# Patient Record
Sex: MALE | Race: WHITE | Employment: OTHER | URBAN - METROPOLITAN AREA
[De-identification: names, ages, dates, MRNs, and addresses within clinical notes are randomized per-mention and may not be internally consistent; named-entity substitution may affect disease eponyms.]

---

## 2017-01-10 DIAGNOSIS — M51.16 LUMBAR DISC DISEASE WITH RADICULOPATHY: Primary | ICD-10-CM

## 2017-01-10 RX ORDER — CYCLOBENZAPRINE HCL 10 MG
TABLET ORAL
Refills: 1 | COMMUNITY
Start: 2017-01-04 | End: 2017-04-20

## 2017-01-10 RX ORDER — LIDOCAINE 50 MG/G
OINTMENT TOPICAL
Qty: 50 G | Refills: 3 | Status: SHIPPED | OUTPATIENT
Start: 2017-01-10 | End: 2017-09-22

## 2017-01-10 NOTE — TELEPHONE ENCOUNTER
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  Patient is working 11 hour days and not getting proper rest. His throat sometimes feels like i

## 2017-01-23 ENCOUNTER — TELEPHONE (OUTPATIENT)
Dept: INTERNAL MEDICINE CLINIC | Facility: CLINIC | Age: 57
End: 2017-01-23

## 2017-01-26 NOTE — TELEPHONE ENCOUNTER
Patient had exposure to cleaning fumes and caused a breathing episode. Advised patient to get checked at the emergency department or urgent for the fumes exposure.    Advised patient not to stop Lisinopril, patient stated he gets dizzy spells from the lisin

## 2017-01-30 ENCOUNTER — TELEPHONE (OUTPATIENT)
Dept: INTERNAL MEDICINE CLINIC | Facility: CLINIC | Age: 57
End: 2017-01-30

## 2017-02-01 ENCOUNTER — TELEPHONE (OUTPATIENT)
Dept: INTERNAL MEDICINE CLINIC | Facility: CLINIC | Age: 57
End: 2017-02-01

## 2017-02-01 NOTE — TELEPHONE ENCOUNTER
Spoke with patient he has a court date pending for disability, made appointment to see Dr Dixie Raman

## 2017-02-01 NOTE — TELEPHONE ENCOUNTER
Right side of right foot about 2 inches from ankle on the bone is very sore he is icing patient is on his feet a lot, Scheduled appointment with Dr Claudette Prado for Sat 2/18

## 2017-02-16 RX ORDER — FLUTICASONE PROPIONATE AND SALMETEROL 500; 50 UG/1; UG/1
1 POWDER RESPIRATORY (INHALATION) 2 TIMES DAILY
Qty: 60 EACH | Refills: 2 | Status: SHIPPED | OUTPATIENT
Start: 2017-02-16 | End: 2017-02-18

## 2017-02-18 ENCOUNTER — OFFICE VISIT (OUTPATIENT)
Dept: INTERNAL MEDICINE CLINIC | Facility: CLINIC | Age: 57
End: 2017-02-18

## 2017-02-18 VITALS
DIASTOLIC BLOOD PRESSURE: 80 MMHG | BODY MASS INDEX: 32.14 KG/M2 | WEIGHT: 217 LBS | HEART RATE: 88 BPM | RESPIRATION RATE: 14 BRPM | TEMPERATURE: 98 F | HEIGHT: 69 IN | SYSTOLIC BLOOD PRESSURE: 132 MMHG | OXYGEN SATURATION: 98 %

## 2017-02-18 DIAGNOSIS — M47.812 OSTEOARTHRITIS OF CERVICAL SPINE, UNSPECIFIED SPINAL OSTEOARTHRITIS COMPLICATION STATUS: ICD-10-CM

## 2017-02-18 DIAGNOSIS — J38.5 LARYNGEAL SPASM: ICD-10-CM

## 2017-02-18 DIAGNOSIS — M21.961 METATARSAL DEFORMITY, RIGHT: ICD-10-CM

## 2017-02-18 DIAGNOSIS — M51.16 LUMBAR DISC DISEASE WITH RADICULOPATHY: ICD-10-CM

## 2017-02-18 DIAGNOSIS — I10 HTN (HYPERTENSION), BENIGN: ICD-10-CM

## 2017-02-18 DIAGNOSIS — M79.671 FOOT PAIN, RIGHT: Primary | ICD-10-CM

## 2017-02-18 DIAGNOSIS — J45.41 MODERATE PERSISTENT ASTHMA WITH ACUTE EXACERBATION: ICD-10-CM

## 2017-02-18 DIAGNOSIS — J44.9 CHRONIC OBSTRUCTIVE PULMONARY DISEASE, UNSPECIFIED COPD TYPE (HCC): ICD-10-CM

## 2017-02-18 DIAGNOSIS — E78.2 MIXED HYPERLIPIDEMIA: ICD-10-CM

## 2017-02-18 PROBLEM — M21.969 METATARSAL DEFORMITY: Status: ACTIVE | Noted: 2017-02-18

## 2017-02-18 PROCEDURE — 99214 OFFICE O/P EST MOD 30 MIN: CPT | Performed by: FAMILY MEDICINE

## 2017-02-18 RX ORDER — LISINOPRIL 10 MG/1
10 TABLET ORAL DAILY
Qty: 90 TABLET | Refills: 3 | Status: SHIPPED | OUTPATIENT
Start: 2017-02-18 | End: 2017-04-20 | Stop reason: ALTCHOICE

## 2017-02-18 RX ORDER — FLUTICASONE PROPIONATE AND SALMETEROL 50; 500 UG/1; UG/1
1 POWDER RESPIRATORY (INHALATION) 2 TIMES DAILY
Qty: 60 EACH | Refills: 11 | Status: SHIPPED | OUTPATIENT
Start: 2017-02-18 | End: 2017-11-09

## 2017-02-18 RX ORDER — PREDNISONE 20 MG/1
20 TABLET ORAL DAILY
Qty: 10 TABLET | Refills: 0 | Status: SHIPPED | OUTPATIENT
Start: 2017-02-18 | End: 2017-02-25

## 2017-02-18 NOTE — PROGRESS NOTES
CC:  Foot Pain      Hx of CC:  GRADUALLY WORSENING RIGHT LATERAL FOOT PAIN WITH PROLONGED WEIGHT BEARING (HEEL IS OK NOW). ALSO NEEDING REFILLS ON ADVAIR DISKUS--IMPROVED ASTHMA WITH DECREASED TOBACCO.   H/O CERVICAL SPINAL FUSION 10 YEARS AGO--C/O SOME GA Oral Tab; Take 1 tablet (20 mg total) by mouth daily. Dispense: 10 tablet; Refill: 0  - Podiatry Referral - External    5. Laryngeal spasm  DISCUSSED    6.  Chronic obstructive pulmonary disease, unspecified COPD type ([facility] [address]  CONTINUE TO ABSTAIN FROM [facility]

## 2017-03-06 RX ORDER — LISINOPRIL 20 MG/1
TABLET ORAL
Qty: 90 TABLET | Refills: 0 | Status: SHIPPED | OUTPATIENT
Start: 2017-03-06 | End: 2017-04-20

## 2017-03-06 RX ORDER — TRAZODONE HYDROCHLORIDE 100 MG/1
100 TABLET ORAL NIGHTLY PRN
Qty: 30 TABLET | Refills: 5 | Status: SHIPPED | OUTPATIENT
Start: 2017-03-06 | End: 2017-09-01

## 2017-03-06 RX ORDER — SIMVASTATIN 40 MG
TABLET ORAL
Qty: 30 TABLET | Refills: 0 | Status: SHIPPED | OUTPATIENT
Start: 2017-03-06 | End: 2017-04-20

## 2017-03-09 ENCOUNTER — OFFICE VISIT (OUTPATIENT)
Dept: INTERNAL MEDICINE CLINIC | Facility: CLINIC | Age: 57
End: 2017-03-09

## 2017-03-09 ENCOUNTER — OFFICE VISIT (OUTPATIENT)
Dept: PODIATRY CLINIC | Facility: CLINIC | Age: 57
End: 2017-03-09

## 2017-03-09 ENCOUNTER — HOSPITAL ENCOUNTER (OUTPATIENT)
Dept: GENERAL RADIOLOGY | Facility: HOSPITAL | Age: 57
Discharge: HOME | End: 2017-03-09
Attending: PODIATRIST

## 2017-03-09 VITALS
OXYGEN SATURATION: 97 % | SYSTOLIC BLOOD PRESSURE: 138 MMHG | DIASTOLIC BLOOD PRESSURE: 90 MMHG | WEIGHT: 217 LBS | BODY MASS INDEX: 32.14 KG/M2 | HEART RATE: 80 BPM | HEIGHT: 69 IN

## 2017-03-09 DIAGNOSIS — M77.50 TENDONITIS OF ANKLE OR FOOT: ICD-10-CM

## 2017-03-09 DIAGNOSIS — M47.812 OSTEOARTHRITIS OF CERVICAL SPINE, UNSPECIFIED SPINAL OSTEOARTHRITIS COMPLICATION STATUS: ICD-10-CM

## 2017-03-09 DIAGNOSIS — M72.2 PLANTAR FASCIITIS OF RIGHT FOOT: Primary | ICD-10-CM

## 2017-03-09 DIAGNOSIS — Q76.1 CERVICAL FUSION SYNDROME: ICD-10-CM

## 2017-03-09 DIAGNOSIS — M79.671 RIGHT FOOT PAIN: Primary | ICD-10-CM

## 2017-03-09 DIAGNOSIS — M79.671 RIGHT FOOT PAIN: ICD-10-CM

## 2017-03-09 DIAGNOSIS — Z71.6 TOBACCO ABUSE COUNSELING: ICD-10-CM

## 2017-03-09 DIAGNOSIS — M51.16 LUMBAR DISC DISEASE WITH RADICULOPATHY: ICD-10-CM

## 2017-03-09 PROCEDURE — L3060 FOOT ARCH SUPP LONGITUD/META: HCPCS | Performed by: PODIATRIST

## 2017-03-09 PROCEDURE — 73630 X-RAY EXAM OF FOOT: CPT

## 2017-03-09 PROCEDURE — 99213 OFFICE O/P EST LOW 20 MIN: CPT | Performed by: FAMILY MEDICINE

## 2017-03-09 PROCEDURE — 99243 OFF/OP CNSLTJ NEW/EST LOW 30: CPT | Performed by: PODIATRIST

## 2017-03-09 PROCEDURE — 99212 OFFICE O/P EST SF 10 MIN: CPT | Performed by: PODIATRIST

## 2017-03-09 RX ORDER — METHYLPREDNISOLONE 4 MG/1
TABLET ORAL
Qty: 1 PACKAGE | Refills: 0 | Status: SHIPPED | OUTPATIENT
Start: 2017-03-09 | End: 2017-07-20 | Stop reason: ALTCHOICE

## 2017-03-09 RX ORDER — TRIAMCINOLONE ACETONIDE 40 MG/ML
40 INJECTION, SUSPENSION INTRA-ARTICULAR; INTRAMUSCULAR ONCE
Status: DISCONTINUED | OUTPATIENT
Start: 2017-03-09 | End: 2017-03-09

## 2017-03-09 RX ORDER — KETOROLAC TROMETHAMINE 30 MG/ML
60 INJECTION, SOLUTION INTRAMUSCULAR; INTRAVENOUS ONCE
Status: DISCONTINUED | OUTPATIENT
Start: 2017-03-09 | End: 2017-03-09

## 2017-03-09 NOTE — PROGRESS NOTES
CC:  Heel Pain      Hx of CC:  RECURRENT RIGHT HEEL AND LATERAL FOOT PAIN. PAIN HAS CHRONIC LOW BACK PAIN AND RIGHT HIP PAIN WHICH AFFECT HIS WALKING. DR. Giselle Reagan HAS MANAGED HIS RADICULOPATHY BUT MAY BE GETTING SURGERY IN FUTURE.      STILL SMOKES BUT GLADYS

## 2017-03-09 NOTE — PROGRESS NOTES
HPI:    Patient ID: Cayetano Meyers is a 55year old male. HPI  This 52-year-old male presents as a new patient to me on consult from [facility]. Patient's right foot is been a problem for several months.   There is no history of injury and patient has had 2 co Omeprazole 40 MG Oral Capsule Delayed Release Take 1 capsule (40 mg total) by mouth daily. Disp: 90 capsule Rfl: 3   pregabalin (LYRICA) 150 MG Oral Cap Take 1 capsule (150 mg total) by mouth 2 (two) times daily.  Disp: 60 capsule Rfl: 2   Fluticasone Pro and I plan to reevaluate in 2-3 weeks         ASSESSMENT/PLAN:   Right foot pain  (primary encounter diagnosis)  Tendonitis of ankle or foot    No orders of the defined types were placed in this encounter.        Meds This Visit:  Signed Prescriptions Disp

## 2017-03-16 ENCOUNTER — TELEPHONE (OUTPATIENT)
Dept: INTERNAL MEDICINE CLINIC | Facility: CLINIC | Age: 57
End: 2017-03-16

## 2017-03-16 RX ORDER — ONDANSETRON 4 MG/1
4 TABLET, FILM COATED ORAL EVERY 8 HOURS PRN
Qty: 20 TABLET | Refills: 2 | Status: SHIPPED | OUTPATIENT
Start: 2017-03-16 | End: 2017-07-20 | Stop reason: ALTCHOICE

## 2017-03-16 RX ORDER — TOPIRAMATE 50 MG/1
50 TABLET, FILM COATED ORAL 2 TIMES DAILY
Qty: 60 TABLET | Refills: 0 | Status: SHIPPED | OUTPATIENT
Start: 2017-03-16 | End: 2017-03-27

## 2017-03-16 NOTE — TELEPHONE ENCOUNTER
Headache started Sunday kind of went away then came back harshly today pounding headache, medications adjusted and sent to patient per Dr Scarlett Vargas.  Patient notified

## 2017-03-24 ENCOUNTER — TELEPHONE (OUTPATIENT)
Dept: INTERNAL MEDICINE CLINIC | Facility: CLINIC | Age: 57
End: 2017-03-24

## 2017-03-24 NOTE — TELEPHONE ENCOUNTER
Verified that the address in the chart is the one the patient is currently residing at again explained to the patient the importance of immediate care for his stated symptoms he said he was on his way to [facility] [place] [place]

## 2017-03-24 NOTE — TELEPHONE ENCOUNTER
VERIFIED THAT PATIENT IS BEING DRIVEN TO [facility]. NEED EVALUATION TO R/O CVA.

## 2017-03-24 NOTE — TELEPHONE ENCOUNTER
Directed the patient to the emergency department at the hospital to go now, call 911.  Patient did not want us to call 911 he was calling the  he is staying with for transportation to the hospital. Informed the patient if this is a stroke time

## 2017-03-27 RX ORDER — TOPIRAMATE 50 MG/1
TABLET, FILM COATED ORAL
Qty: 60 TABLET | Refills: 2 | Status: SHIPPED | OUTPATIENT
Start: 2017-03-27 | End: 2017-08-08

## 2017-04-03 ENCOUNTER — TELEPHONE (OUTPATIENT)
Dept: INTERNAL MEDICINE CLINIC | Facility: CLINIC | Age: 57
End: 2017-04-03

## 2017-04-03 RX ORDER — PREGABALIN 150 MG/1
150 CAPSULE ORAL 2 TIMES DAILY
Qty: 60 CAPSULE | Refills: 0 | Status: CANCELLED | OUTPATIENT
Start: 2017-04-03

## 2017-04-03 NOTE — TELEPHONE ENCOUNTER
Lyrica 150mg script called into the pharmacy per Dr Scarlett Vargas reorder it same as Dr Farhad Santoyo has prescribed for the patient  Patient informed about medications and instructed about prednisone ok to take script per Dr Scarlett Vargas just do not take script for pred 20mg an

## 2017-04-03 NOTE — TELEPHONE ENCOUNTER
Patient was not seen at [facility] Emergency department when her went last week with speech issues and painful headache. Patient was left to set for over 45 minutes and treated rudely.   Patient is in need of Lyrica and cannot get a call back from Dr Rose kraus

## 2017-04-06 ENCOUNTER — TELEPHONE (OUTPATIENT)
Dept: INTERNAL MEDICINE CLINIC | Facility: CLINIC | Age: 57
End: 2017-04-06

## 2017-04-06 NOTE — TELEPHONE ENCOUNTER
Unable to get appointment with Dr Marya Levine, wants to schedule with Dr Wilton Allen, arranging transportation will call to schedule

## 2017-04-10 ENCOUNTER — TELEPHONE (OUTPATIENT)
Dept: INTERNAL MEDICINE CLINIC | Facility: CLINIC | Age: 57
End: 2017-04-10

## 2017-04-10 NOTE — TELEPHONE ENCOUNTER
Currently, patient experinencing increased back pain greater difficulty with movement and Dr Fuentes Minus out of town for 2 weeks

## 2017-04-10 NOTE — TELEPHONE ENCOUNTER
Bailey Doe does not [facility] any longer, patient could not afford to be seen. Patient still has stuttering and word search, with forgetfulness.  The pharmacist suggested that it could be the toparimate but patient does not want to stop the toparimate for co

## 2017-04-12 ENCOUNTER — TELEPHONE (OUTPATIENT)
Dept: INTERNAL MEDICINE CLINIC | Facility: CLINIC | Age: 57
End: 2017-04-12

## 2017-04-14 NOTE — TELEPHONE ENCOUNTER
Patient called back when he stops the toparimate the stuttering and word search issues resolve but the headaches hurt too bad to stop the medication. Currently, he is having muscle spasms down one leg. Looks forward to rest this evening.  Please call him if

## 2017-04-20 ENCOUNTER — OFFICE VISIT (OUTPATIENT)
Dept: INTERNAL MEDICINE CLINIC | Facility: CLINIC | Age: 57
End: 2017-04-20

## 2017-04-20 VITALS
SYSTOLIC BLOOD PRESSURE: 142 MMHG | DIASTOLIC BLOOD PRESSURE: 94 MMHG | OXYGEN SATURATION: 98 % | HEART RATE: 82 BPM | WEIGHT: 219 LBS | HEIGHT: 69 IN | BODY MASS INDEX: 32.44 KG/M2

## 2017-04-20 DIAGNOSIS — M50.90 CERVICAL DISC DISEASE: ICD-10-CM

## 2017-04-20 DIAGNOSIS — Z12.11 COLON CANCER SCREENING: ICD-10-CM

## 2017-04-20 DIAGNOSIS — E78.2 MIXED HYPERLIPIDEMIA: ICD-10-CM

## 2017-04-20 DIAGNOSIS — I10 ESSENTIAL HYPERTENSION: ICD-10-CM

## 2017-04-20 DIAGNOSIS — I73.9: Chronic | ICD-10-CM

## 2017-04-20 DIAGNOSIS — M47.812 OSTEOARTHRITIS OF CERVICAL SPINE, UNSPECIFIED SPINAL OSTEOARTHRITIS COMPLICATION STATUS: ICD-10-CM

## 2017-04-20 DIAGNOSIS — R51.9 NONINTRACTABLE HEADACHE, UNSPECIFIED CHRONICITY PATTERN, UNSPECIFIED HEADACHE TYPE: ICD-10-CM

## 2017-04-20 DIAGNOSIS — Z11.59 NEED FOR HEPATITIS C SCREENING TEST: ICD-10-CM

## 2017-04-20 DIAGNOSIS — Z12.5 SCREENING FOR PROSTATE CANCER: ICD-10-CM

## 2017-04-20 DIAGNOSIS — L30.9 ECZEMA, UNSPECIFIED TYPE: ICD-10-CM

## 2017-04-20 DIAGNOSIS — K22.719 BARRETT'S ESOPHAGUS WITH DYSPLASIA: ICD-10-CM

## 2017-04-20 DIAGNOSIS — R51.9 RIGHT-SIDED HEADACHE: Primary | ICD-10-CM

## 2017-04-20 DIAGNOSIS — M51.16 LUMBAR DISC DISEASE WITH RADICULOPATHY: ICD-10-CM

## 2017-04-20 PROCEDURE — 36415 COLL VENOUS BLD VENIPUNCTURE: CPT | Performed by: FAMILY MEDICINE

## 2017-04-20 PROCEDURE — 84153 ASSAY OF PSA TOTAL: CPT | Performed by: FAMILY MEDICINE

## 2017-04-20 PROCEDURE — 80053 COMPREHEN METABOLIC PANEL: CPT | Performed by: FAMILY MEDICINE

## 2017-04-20 PROCEDURE — 99214 OFFICE O/P EST MOD 30 MIN: CPT | Performed by: FAMILY MEDICINE

## 2017-04-20 PROCEDURE — 86803 HEPATITIS C AB TEST: CPT | Performed by: FAMILY MEDICINE

## 2017-04-20 PROCEDURE — 80061 LIPID PANEL: CPT | Performed by: FAMILY MEDICINE

## 2017-04-20 RX ORDER — LISINOPRIL 20 MG/1
20 TABLET ORAL DAILY
Qty: 90 TABLET | Refills: 1 | Status: SHIPPED | OUTPATIENT
Start: 2017-04-20 | End: 2017-08-10 | Stop reason: ALTCHOICE

## 2017-04-20 RX ORDER — SIMVASTATIN 40 MG
40 TABLET ORAL NIGHTLY
Qty: 90 TABLET | Refills: 3 | Status: SHIPPED | OUTPATIENT
Start: 2017-04-20 | End: 2017-12-15 | Stop reason: ALTCHOICE

## 2017-04-20 RX ORDER — TOPIRAMATE 50 MG/1
50 TABLET, FILM COATED ORAL EVERY EVENING
Qty: 90 TABLET | Refills: 1 | Status: SHIPPED | OUTPATIENT
Start: 2017-04-20 | End: 2017-08-08

## 2017-04-20 RX ORDER — TOPIRAMATE 25 MG/1
25 TABLET ORAL EVERY MORNING
Qty: 90 TABLET | Refills: 1 | Status: SHIPPED | OUTPATIENT
Start: 2017-04-20 | End: 2017-08-08

## 2017-04-20 RX ORDER — CYCLOBENZAPRINE HCL 10 MG
10 TABLET ORAL 3 TIMES DAILY PRN
Qty: 90 TABLET | Refills: 1 | Status: SHIPPED | OUTPATIENT
Start: 2017-04-20 | End: 2017-07-11

## 2017-04-20 NOTE — PROGRESS NOTES
Pt presented to clinic today for blood draw. Per physician able to draw orders. Orders  documented within chart. Pt tolerated lab draw well.  verified.   Orders drawn include: cmp, lipid, cab, psa  Site of draw: rt arm   Irene Punches, CMA

## 2017-04-21 ENCOUNTER — TELEPHONE (OUTPATIENT)
Dept: INTERNAL MEDICINE CLINIC | Facility: CLINIC | Age: 57
End: 2017-04-21

## 2017-04-21 DIAGNOSIS — L20.9 ATOPIC DERMATITIS, UNSPECIFIED TYPE: Primary | ICD-10-CM

## 2017-04-21 NOTE — PROGRESS NOTES
CC:  Headache and Hand Pain      Hx of CC:  CHRONIC INTERMITTENT RIGHT SIDED NECK AND TEMPORAL HEADACHES (SECONDARY TO CERVICAL DISC DISEASE) WITH RELIEF ON TOPAMAX 50 MG BID BUT GETTING CONFUSED AND SLURRED SPEECH WHILE ON TOPAMAX.     GETTING BILATERAL HA Refill: 3  - Comp Metabolic Panel (14) [E]  - Lipid Panel [E]    6. Essential hypertension:  MILD ELEVATION TODAY    - Comp Metabolic Panel (14) [E]  - lisinopril 20 MG Oral Tab; Take 1 tablet (20 mg total) by mouth daily. Dispense: 90 tablet;  Refill: 1

## 2017-04-26 ENCOUNTER — TELEPHONE (OUTPATIENT)
Dept: INTERNAL MEDICINE CLINIC | Facility: CLINIC | Age: 57
End: 2017-04-26

## 2017-04-26 NOTE — TELEPHONE ENCOUNTER
Patient stated his hip, and ankle ache today.  He is taking the norco can take 2 per dose per instruction advised to take 2 now and see if pain decreases to tolerable advised not to take more than 4000mg of tylenol in a 24 hour period as he is dosing the me

## 2017-05-01 ENCOUNTER — TELEPHONE (OUTPATIENT)
Dept: INTERNAL MEDICINE CLINIC | Facility: CLINIC | Age: 57
End: 2017-05-01

## 2017-05-02 ENCOUNTER — TELEPHONE (OUTPATIENT)
Dept: INTERNAL MEDICINE CLINIC | Facility: CLINIC | Age: 57
End: 2017-05-02

## 2017-05-02 RX ORDER — PREDNISONE 20 MG/1
20 TABLET ORAL DAILY
Qty: 7 TABLET | Refills: 0 | Status: SHIPPED | OUTPATIENT
Start: 2017-05-02 | End: 2017-05-09

## 2017-05-02 NOTE — TELEPHONE ENCOUNTER
Prednisone 20mg 1 daily x7 sent to the pharmacy to help reduce inflammation in back and breathing.   Patient preparing to schedule back surgery will need pre-op when scheduled

## 2017-05-05 ENCOUNTER — TELEPHONE (OUTPATIENT)
Dept: INTERNAL MEDICINE CLINIC | Facility: CLINIC | Age: 57
End: 2017-05-05

## 2017-05-05 NOTE — TELEPHONE ENCOUNTER
Patient called c/o \" eyelid turning purple , blood vessel breaking  around eyes, and hands\". He felt his face is swollen. No tongue or lips swelling. Currently on Prednisone due to back pain. P : Advised to go to nearest ED.  He will proceed to [address] [place] [address]

## 2017-05-06 ENCOUNTER — TELEPHONE (OUTPATIENT)
Dept: INTERNAL MEDICINE CLINIC | Facility: CLINIC | Age: 57
End: 2017-05-06

## 2017-05-06 DIAGNOSIS — J30.1 SEASONAL ALLERGIC RHINITIS DUE TO POLLEN: Primary | ICD-10-CM

## 2017-05-06 RX ORDER — FLUTICASONE PROPIONATE 50 MCG
2 SPRAY, SUSPENSION (ML) NASAL 2 TIMES DAILY PRN
Qty: 3 BOTTLE | Refills: 3 | Status: SHIPPED | OUTPATIENT
Start: 2017-05-06 | End: 2018-04-02

## 2017-05-10 ENCOUNTER — TELEPHONE (OUTPATIENT)
Dept: INTERNAL MEDICINE CLINIC | Facility: CLINIC | Age: 57
End: 2017-05-10

## 2017-05-11 ENCOUNTER — TELEPHONE (OUTPATIENT)
Dept: INTERNAL MEDICINE CLINIC | Facility: CLINIC | Age: 57
End: 2017-05-11

## 2017-05-15 ENCOUNTER — TELEPHONE (OUTPATIENT)
Dept: INTERNAL MEDICINE CLINIC | Facility: CLINIC | Age: 57
End: 2017-05-15

## 2017-05-15 RX ORDER — AZITHROMYCIN 250 MG/1
TABLET, FILM COATED ORAL
COMMUNITY
Start: 2017-05-08 | End: 2017-07-20 | Stop reason: ALTCHOICE

## 2017-05-15 RX ORDER — PREDNISONE 20 MG/1
TABLET ORAL
COMMUNITY
Start: 2017-05-02 | End: 2017-07-20 | Stop reason: ALTCHOICE

## 2017-05-15 NOTE — TELEPHONE ENCOUNTER
Spoke with patient he has increasing left foot pain very similar now to the constant pain he has in his right foot, the tingling turns to stabbing pain.  Patient is experiencing low back pelvic pain which radiates around to the front of his abdomen on the R

## 2017-05-16 ENCOUNTER — TELEPHONE (OUTPATIENT)
Dept: INTERNAL MEDICINE CLINIC | Facility: CLINIC | Age: 57
End: 2017-05-16

## 2017-05-23 ENCOUNTER — TELEPHONE (OUTPATIENT)
Dept: INTERNAL MEDICINE CLINIC | Facility: CLINIC | Age: 57
End: 2017-05-23

## 2017-05-23 NOTE — TELEPHONE ENCOUNTER
Patient has severe headache and painful neck patient requesting to take 50mg toparimate twice daily per Dr Delvis Padilla it will cause him more confusion, but atient can take it at that dose.  Patient has upcoming disability court date 6/1/2017 he is very concerned

## 2017-06-05 DIAGNOSIS — J42 CHRONIC BRONCHITIS, UNSPECIFIED CHRONIC BRONCHITIS TYPE (HCC): Primary | ICD-10-CM

## 2017-06-05 DIAGNOSIS — J44.9 CHRONIC OBSTRUCTIVE PULMONARY DISEASE, UNSPECIFIED COPD TYPE (HCC): ICD-10-CM

## 2017-06-05 RX ORDER — DOXYCYCLINE HYCLATE 100 MG/1
100 CAPSULE ORAL 2 TIMES DAILY
Qty: 20 CAPSULE | Refills: 0 | Status: SHIPPED | OUTPATIENT
Start: 2017-06-05 | End: 2017-07-20 | Stop reason: ALTCHOICE

## 2017-06-05 RX ORDER — ALBUTEROL SULFATE 2.5 MG/3ML
2.5 SOLUTION RESPIRATORY (INHALATION) EVERY 8 HOURS PRN
Qty: 1 BOX | Refills: 5 | Status: SHIPPED | OUTPATIENT
Start: 2017-06-05 | End: 2017-08-10

## 2017-06-05 RX ORDER — LISINOPRIL 10 MG/1
TABLET ORAL
Refills: 2 | COMMUNITY
Start: 2017-05-30 | End: 2017-08-10 | Stop reason: ALTCHOICE

## 2017-06-05 NOTE — TELEPHONE ENCOUNTER
Patient picked a zit/cyst on his hip it oozed a bit of puss and now is red and sore would Doxycycline for this condition, please refill his albuterol nebulizer solution

## 2017-06-15 ENCOUNTER — TELEPHONE (OUTPATIENT)
Dept: INTERNAL MEDICINE CLINIC | Facility: CLINIC | Age: 57
End: 2017-06-15

## 2017-06-15 DIAGNOSIS — J44.9 CHRONIC OBSTRUCTIVE PULMONARY DISEASE, UNSPECIFIED COPD TYPE (HCC): Primary | ICD-10-CM

## 2017-06-15 RX ORDER — METOPROLOL TARTRATE 100 MG/1
TABLET ORAL
Refills: 1 | COMMUNITY
Start: 2017-06-05 | End: 2017-07-20 | Stop reason: ALTCHOICE

## 2017-06-15 RX ORDER — SOFT LENS DISINFECTANT
SOLUTION, NON-ORAL MISCELLANEOUS
Qty: 1 DEVICE | Refills: 0 | Status: SHIPPED | OUTPATIENT
Start: 2017-06-15 | End: 2019-09-30

## 2017-06-21 ENCOUNTER — TELEPHONE (OUTPATIENT)
Dept: INTERNAL MEDICINE CLINIC | Facility: CLINIC | Age: 57
End: 2017-06-21

## 2017-07-05 ENCOUNTER — TELEPHONE (OUTPATIENT)
Dept: INTERNAL MEDICINE CLINIC | Facility: CLINIC | Age: 57
End: 2017-07-05

## 2017-07-05 NOTE — TELEPHONE ENCOUNTER
Dr. Alex Monroe  Neurosurgery   [address] [facility] [id number], [place] [address]   [phone]

## 2017-07-10 ENCOUNTER — TELEPHONE (OUTPATIENT)
Dept: INTERNAL MEDICINE CLINIC | Facility: CLINIC | Age: 57
End: 2017-07-10

## 2017-07-10 DIAGNOSIS — M51.16 LUMBAR DISC DISEASE WITH RADICULOPATHY: Primary | ICD-10-CM

## 2017-07-10 NOTE — TELEPHONE ENCOUNTER
Per patient Dr José Antonio Ogden wants to due surgery from L2 -S1. Patient is not living anywhere currently. Patient would like the MRI he is in terrible pain.  Patient states that Dr José Antonio Ogden wants MRI from L2-S1 without contrast. Explained to the patient the need for jama

## 2017-07-10 NOTE — TELEPHONE ENCOUNTER
Spoke with Dr Glenn Lagunas MRI order placed, per Dr Glenn Lagunas he is willing to order skilled nursing facility for the patient post surgery.  Provided this information to Chidi Torres and requested the transportation assistance for the patient to and  from necessary appointme

## 2017-07-10 NOTE — TELEPHONE ENCOUNTER
Spoke with Dmitry Santamaria, MRI has not n=been ordered by Dr Benuel Crigler due to patients current living or nonexistent living arrangements and inability to reach Dr Reji Chin to verify the MRI the patient is requesting. Currently we have no orders or notes from Dr Reji Chin.  Prov

## 2017-07-11 DIAGNOSIS — R51.9 RIGHT-SIDED HEADACHE: ICD-10-CM

## 2017-07-11 DIAGNOSIS — M47.812 OSTEOARTHRITIS OF CERVICAL SPINE, UNSPECIFIED SPINAL OSTEOARTHRITIS COMPLICATION STATUS: ICD-10-CM

## 2017-07-13 ENCOUNTER — TELEPHONE (OUTPATIENT)
Dept: INTERNAL MEDICINE CLINIC | Facility: CLINIC | Age: 57
End: 2017-07-13

## 2017-07-13 RX ORDER — CYCLOBENZAPRINE HCL 10 MG
TABLET ORAL
Qty: 90 TABLET | Refills: 0 | Status: SHIPPED | OUTPATIENT
Start: 2017-07-13 | End: 2017-08-08

## 2017-07-13 NOTE — TELEPHONE ENCOUNTER
Patient would like the doctor to know he is scheduled for an open MRI in [facility] Tuesday at 1:30.

## 2017-07-17 ENCOUNTER — TELEPHONE (OUTPATIENT)
Dept: INTERNAL MEDICINE CLINIC | Facility: CLINIC | Age: 57
End: 2017-07-17

## 2017-07-17 NOTE — TELEPHONE ENCOUNTER
Patient called to report that he is scheduled for tomorrow in [facility] for an MRI. Also he fell down an entire set of stairs, hurt his leg and back. Patient states he can barely move.

## 2017-07-18 ENCOUNTER — MED REC SCAN ONLY (OUTPATIENT)
Dept: INTERNAL MEDICINE CLINIC | Facility: CLINIC | Age: 57
End: 2017-07-18

## 2017-07-18 ENCOUNTER — REASSESSMENT (OUTPATIENT)
Dept: INTERNAL MEDICINE CLINIC | Facility: CLINIC | Age: 57
End: 2017-07-18

## 2017-07-18 ENCOUNTER — TELEPHONE (OUTPATIENT)
Dept: INTERNAL MEDICINE CLINIC | Facility: CLINIC | Age: 57
End: 2017-07-18

## 2017-07-18 NOTE — TELEPHONE ENCOUNTER
LUMBAR MRI FROM MOLECULAR IMAGING REVEALS:     L3-4 MILD BILATERAL FORAMINAL STENOSIS AND NARROWING OF THE LATERAL RECESSES    L4-5 MODERATE TO MILDLY SEVERE BILATERAL FORAMINAL STENOSIS AND MILD CENTRAL SPINAL STENOSIS    L5-S1 MODERATE BILATERAL FORAMINA

## 2017-07-20 ENCOUNTER — OFFICE VISIT (OUTPATIENT)
Dept: INTERNAL MEDICINE CLINIC | Facility: CLINIC | Age: 57
End: 2017-07-20

## 2017-07-20 VITALS
WEIGHT: 212 LBS | HEART RATE: 94 BPM | BODY MASS INDEX: 31.4 KG/M2 | SYSTOLIC BLOOD PRESSURE: 138 MMHG | DIASTOLIC BLOOD PRESSURE: 88 MMHG | HEIGHT: 69 IN | OXYGEN SATURATION: 99 % | RESPIRATION RATE: 18 BRPM

## 2017-07-20 DIAGNOSIS — Z01.818 PREOP EXAMINATION: Primary | ICD-10-CM

## 2017-07-20 DIAGNOSIS — J44.9 ASTHMA WITH COPD (CHRONIC OBSTRUCTIVE PULMONARY DISEASE) (HCC): ICD-10-CM

## 2017-07-20 DIAGNOSIS — I10 HTN, GOAL BELOW 140/90: ICD-10-CM

## 2017-07-20 DIAGNOSIS — E78.2 MIXED HYPERLIPIDEMIA: ICD-10-CM

## 2017-07-20 DIAGNOSIS — M51.16 LUMBAR DISC DISEASE WITH RADICULOPATHY: ICD-10-CM

## 2017-07-20 PROBLEM — J44.89 ASTHMA WITH COPD (CHRONIC OBSTRUCTIVE PULMONARY DISEASE): Status: ACTIVE | Noted: 2017-07-20

## 2017-07-20 PROCEDURE — 99214 OFFICE O/P EST MOD 30 MIN: CPT | Performed by: FAMILY MEDICINE

## 2017-07-20 PROCEDURE — 93000 ELECTROCARDIOGRAM COMPLETE: CPT | Performed by: FAMILY MEDICINE

## 2017-07-20 RX ORDER — HYDROCODONE BITARTRATE AND ACETAMINOPHEN 10; 325 MG/1; MG/1
1 TABLET ORAL EVERY 4 HOURS PRN
Qty: 180 TABLET | Refills: 0 | Status: SHIPPED | OUTPATIENT
Start: 2017-07-20 | End: 2017-10-31

## 2017-07-20 NOTE — PROGRESS NOTES
Veda Jeong is a 65year old male. Patient presents with: Follow - Up: Pt presents to clinic for f/up to right left nerve pain, back pain and bone spurs. HPI:   PREOP EXAM      PRE-OP Physical  What is the full name of procedure/ surgery?   LUMBAR DIS Rfl: 5   Fluticasone Propionate 50 MCG/ACT Nasal Suspension 2 sprays by Nasal route 2 (two) times daily as needed for Rhinitis. Disp: 3 Bottle Rfl: 3   triamcinolone acetonide 0.1 % External Cream Apply topically 2 (two) times daily as needed.  Disp: 120 g Packs/day: 0.50      Years: 0.00         Types: Cigarettes  Smokeless tobacco: Never Used                      Comment: 10-11 cigarettes per day  Alcohol use:  Yes              Comment: 5x per year       BP Readings from Last 6 Encounters:  07/2 PANEL (14)  - CBC WITH DIFFERENTIAL WITH PLATELET  - PROTHROMBIN TIME (PT); Future  - URINALYSIS, ROUTINE  - XR CHEST PA + LAT CHEST (CPT=43987); Future  - ELECTROCARDIOGRAM, COMPLETE  - MRSA SCREEN BY PCR; Future    2.  Lumbar disc disease with radiculopat

## 2017-08-01 ENCOUNTER — TELEPHONE (OUTPATIENT)
Dept: INTERNAL MEDICINE CLINIC | Facility: CLINIC | Age: 57
End: 2017-08-01

## 2017-08-01 NOTE — TELEPHONE ENCOUNTER
Pt's  verified  Pt called and l/m requesting a call from [address] [place] [address]- I called Pt back but n/a and no vm set up to l/m

## 2017-08-03 ENCOUNTER — TELEPHONE (OUTPATIENT)
Dept: INTERNAL MEDICINE CLINIC | Facility: CLINIC | Age: 57
End: 2017-08-03

## 2017-08-08 DIAGNOSIS — R51.9 RIGHT-SIDED HEADACHE: ICD-10-CM

## 2017-08-08 DIAGNOSIS — M47.812 OSTEOARTHRITIS OF CERVICAL SPINE, UNSPECIFIED SPINAL OSTEOARTHRITIS COMPLICATION STATUS: ICD-10-CM

## 2017-08-08 RX ORDER — TOPIRAMATE 50 MG/1
TABLET, FILM COATED ORAL
Qty: 60 TABLET | Refills: 2 | Status: SHIPPED | OUTPATIENT
Start: 2017-08-08 | End: 2018-01-22 | Stop reason: ALTCHOICE

## 2017-08-08 RX ORDER — CYCLOBENZAPRINE HCL 10 MG
TABLET ORAL
Qty: 90 TABLET | Refills: 2 | Status: SHIPPED | OUTPATIENT
Start: 2017-08-08 | End: 2017-12-02

## 2017-08-09 ENCOUNTER — TELEPHONE (OUTPATIENT)
Dept: INTERNAL MEDICINE CLINIC | Facility: CLINIC | Age: 57
End: 2017-08-09

## 2017-08-09 NOTE — TELEPHONE ENCOUNTER
His leg pain is getting worse has appointment with Dr Antony Reece tomorrow and an appointment with Dr Yashira Ellis. Patient explained he is attempting to find permanent living arrangements.  E has not been taking his lyrica either he has a ride to the pharmacy to get me

## 2017-08-10 ENCOUNTER — OFFICE VISIT (OUTPATIENT)
Dept: INTERNAL MEDICINE CLINIC | Facility: CLINIC | Age: 57
End: 2017-08-10

## 2017-08-10 VITALS
OXYGEN SATURATION: 99 % | DIASTOLIC BLOOD PRESSURE: 96 MMHG | BODY MASS INDEX: 31.7 KG/M2 | HEIGHT: 69 IN | RESPIRATION RATE: 18 BRPM | HEART RATE: 84 BPM | SYSTOLIC BLOOD PRESSURE: 148 MMHG | WEIGHT: 214 LBS

## 2017-08-10 DIAGNOSIS — Z72.0 TOBACCO ABUSE: ICD-10-CM

## 2017-08-10 DIAGNOSIS — I10 HTN, GOAL BELOW 140/90: ICD-10-CM

## 2017-08-10 DIAGNOSIS — J44.9 ASTHMA WITH COPD (CHRONIC OBSTRUCTIVE PULMONARY DISEASE) (HCC): Primary | ICD-10-CM

## 2017-08-10 DIAGNOSIS — M51.16 LUMBAR DISC DISEASE WITH RADICULOPATHY: ICD-10-CM

## 2017-08-10 DIAGNOSIS — J44.9 CHRONIC OBSTRUCTIVE PULMONARY DISEASE, UNSPECIFIED COPD TYPE (HCC): ICD-10-CM

## 2017-08-10 DIAGNOSIS — S96.911A RIGHT FOOT STRAIN, INITIAL ENCOUNTER: ICD-10-CM

## 2017-08-10 PROCEDURE — 99214 OFFICE O/P EST MOD 30 MIN: CPT | Performed by: FAMILY MEDICINE

## 2017-08-10 RX ORDER — BUPROPION HYDROCHLORIDE 150 MG/1
150 TABLET, EXTENDED RELEASE ORAL DAILY
Qty: 90 TABLET | Refills: 0 | Status: SHIPPED | OUTPATIENT
Start: 2017-08-10 | End: 2017-10-31 | Stop reason: ALTCHOICE

## 2017-08-10 RX ORDER — ALBUTEROL SULFATE 2.5 MG/3ML
2.5 SOLUTION RESPIRATORY (INHALATION) EVERY 8 HOURS PRN
Qty: 3 BOX | Refills: 5 | Status: SHIPPED | OUTPATIENT
Start: 2017-08-10 | End: 2017-11-09

## 2017-08-10 RX ORDER — MELOXICAM 15 MG/1
15 TABLET ORAL DAILY
Qty: 30 TABLET | Refills: 2 | Status: SHIPPED | OUTPATIENT
Start: 2017-08-10 | End: 2017-10-31

## 2017-08-10 RX ORDER — PREGABALIN 150 MG/1
150 CAPSULE ORAL 2 TIMES DAILY
Qty: 60 CAPSULE | Refills: 2 | Status: SHIPPED | OUTPATIENT
Start: 2017-08-10 | End: 2017-10-31

## 2017-08-10 RX ORDER — PREDNISONE 1 MG/1
5 TABLET ORAL DAILY
Qty: 30 TABLET | Refills: 1 | Status: SHIPPED | OUTPATIENT
Start: 2017-08-10 | End: 2018-01-22 | Stop reason: ALTCHOICE

## 2017-08-10 RX ORDER — HYDROCODONE BITARTRATE AND ACETAMINOPHEN 10; 325 MG/1; MG/1
1 TABLET ORAL EVERY 4 HOURS PRN
Qty: 180 TABLET | Refills: 0 | Status: SHIPPED | OUTPATIENT
Start: 2017-08-10 | End: 2017-09-09

## 2017-08-10 RX ORDER — HYDROCODONE BITARTRATE AND ACETAMINOPHEN 10; 325 MG/1; MG/1
1 TABLET ORAL EVERY 4 HOURS PRN
Qty: 180 TABLET | Refills: 0 | Status: SHIPPED | OUTPATIENT
Start: 2017-09-09 | End: 2017-10-09

## 2017-08-10 RX ORDER — LISINOPRIL AND HYDROCHLOROTHIAZIDE 20; 12.5 MG/1; MG/1
1 TABLET ORAL DAILY
Qty: 90 TABLET | Refills: 3 | Status: SHIPPED | OUTPATIENT
Start: 2017-08-10 | End: 2017-10-31 | Stop reason: DRUGHIGH

## 2017-08-10 RX ORDER — HYDROCODONE BITARTRATE AND ACETAMINOPHEN 10; 325 MG/1; MG/1
1 TABLET ORAL EVERY 4 HOURS PRN
Qty: 180 TABLET | Refills: 0 | Status: SHIPPED | OUTPATIENT
Start: 2017-10-09 | End: 2017-10-31

## 2017-08-12 NOTE — PROGRESS NOTES
CC:  Pre-Op Exam (Pt presents to clinic for pre-op clearance-->had EKG last visit. Needs bloodowork. )      Hx of CC:  F/up on spinal stenosis, htn, asthma, etc.  C/O LOW BACK PAIN (CHRONIC) WITH RADIATION TO LEGS (R > L).   SOMETIMES RIGHT LEG \"GIVES OUT\ total) by mouth daily. PRN PAIN  Dispense: 30 tablet; Refill: 2    4.  Chronic obstructive pulmonary disease, unspecified COPD type (HCC)  D/C TOB  ADVAIR BID, ON LOW DOSE PREDNISONE X 1 MONTH  - albuterol sulfate (2.5 MG/3ML) 0.083% Inhalation Nebu Soln; T

## 2017-08-25 ENCOUNTER — TELEPHONE (OUTPATIENT)
Dept: INTERNAL MEDICINE CLINIC | Facility: CLINIC | Age: 57
End: 2017-08-25

## 2017-08-31 ENCOUNTER — TELEPHONE (OUTPATIENT)
Dept: INTERNAL MEDICINE CLINIC | Facility: CLINIC | Age: 57
End: 2017-08-31

## 2017-08-31 NOTE — TELEPHONE ENCOUNTER
Patient states Meloxicam was working in the beginning but now it's not. He hasn't slept for 4 days and he can barely walk.

## 2017-09-01 ENCOUNTER — TELEPHONE (OUTPATIENT)
Dept: INTERNAL MEDICINE CLINIC | Facility: CLINIC | Age: 57
End: 2017-09-01

## 2017-09-01 RX ORDER — TRAZODONE HYDROCHLORIDE 100 MG/1
TABLET ORAL
Qty: 30 TABLET | Refills: 2 | Status: SHIPPED | OUTPATIENT
Start: 2017-09-01 | End: 2018-04-02

## 2017-09-01 NOTE — TELEPHONE ENCOUNTER
Patient states he does have pain relief when takes the Meloxicam 15mg last from 8am until 3pm. Per Dr Chadwick Abt evening pain coverage use the Norco. Patient had an allergic respose to visiting a friend at a building which was sprayed for bugs, his breathing is

## 2017-09-08 ENCOUNTER — TELEPHONE (OUTPATIENT)
Dept: INTERNAL MEDICINE CLINIC | Facility: CLINIC | Age: 57
End: 2017-09-08

## 2017-09-08 DIAGNOSIS — J42 CHRONIC BRONCHITIS, UNSPECIFIED CHRONIC BRONCHITIS TYPE (HCC): ICD-10-CM

## 2017-09-08 RX ORDER — DOXYCYCLINE HYCLATE 100 MG/1
CAPSULE ORAL
Qty: 20 CAPSULE | Refills: 0 | Status: SHIPPED | OUTPATIENT
Start: 2017-09-08 | End: 2017-10-31 | Stop reason: ALTCHOICE

## 2017-09-13 ENCOUNTER — TELEPHONE (OUTPATIENT)
Dept: INTERNAL MEDICINE CLINIC | Facility: CLINIC | Age: 57
End: 2017-09-13

## 2017-09-13 NOTE — TELEPHONE ENCOUNTER
Understands Dr Wilton Allen is not in the office today, he needed to cancel appointment and wanted to update Dr Wilton Allen on his condition to see if he wanted to change medications

## 2017-09-13 NOTE — TELEPHONE ENCOUNTER
Patient restarted the lisinopril with the hctz last week and since he started taking it he has not felt well.   Dizziness and chest pain he is noticing plus his neck is swollen and painful on the right side feels like a hard ball in his neck, his is experie

## 2017-09-19 ENCOUNTER — TELEPHONE (OUTPATIENT)
Dept: INTERNAL MEDICINE CLINIC | Facility: CLINIC | Age: 57
End: 2017-09-19

## 2017-09-21 ENCOUNTER — TELEPHONE (OUTPATIENT)
Dept: INTERNAL MEDICINE CLINIC | Facility: CLINIC | Age: 57
End: 2017-09-21

## 2017-09-21 RX ORDER — INFLUENZA A VIRUS A/CHRISTCHURCH/16/2010 NIB-74 (H1N1) HEMAGGLUTININ ANTIGEN (PROPIOLACTONE INACTIVATED), INFLUENZA A VIRUS A/SWITZERLAND/9715293/2013, NIB-88 (H3N2) HEMAGGLUTININ ANTIGEN (PROPIOLACTONE INACTIVATED), INFLUENZA B VIRUS B/PHUKET/3073/2013 - WILD TYPE HEMAGGLUTININ ANTIGEN (PROPIOLACTONE INACTIVATED) 15; 15; 15 UG/.5ML; UG/.5ML; UG/.5ML
INJECTION, SUSPENSION INTRAMUSCULAR
Refills: 0 | Status: ON HOLD | COMMUNITY
Start: 2017-09-08 | End: 2018-10-01

## 2017-09-22 ENCOUNTER — TELEPHONE (OUTPATIENT)
Dept: INTERNAL MEDICINE CLINIC | Facility: CLINIC | Age: 57
End: 2017-09-22

## 2017-09-22 RX ORDER — LIDOCAINE 40 MG/G
1 CREAM TOPICAL 3 TIMES DAILY
Qty: 133 G | Refills: 3 | Status: SHIPPED | OUTPATIENT
Start: 2017-09-22 | End: 2017-11-09

## 2017-10-03 ENCOUNTER — TELEPHONE (OUTPATIENT)
Dept: INTERNAL MEDICINE CLINIC | Facility: CLINIC | Age: 57
End: 2017-10-03

## 2017-10-03 NOTE — TELEPHONE ENCOUNTER
Patient called and asked to speak to Emilia and also requesting a call back. Said his  right lung is burning and he doesn't feel right. Per Emilia told patient to go to ER. Patient said OK and would like a call back.

## 2017-10-04 ENCOUNTER — TELEPHONE (OUTPATIENT)
Dept: INTERNAL MEDICINE CLINIC | Facility: CLINIC | Age: 57
End: 2017-10-04

## 2017-10-04 RX ORDER — LIDOCAINE 50 MG/G
OINTMENT TOPICAL
Refills: 6 | COMMUNITY
Start: 2017-08-30 | End: 2017-10-31 | Stop reason: ALTCHOICE

## 2017-10-04 NOTE — TELEPHONE ENCOUNTER
Patient states he is having breathing and lung issues, so much so that his back hurts. He desperately wants a breathing machine. Ann Mcdermott has cancelled his insurance. He will have [employer] effective 11/1/17.  He would like to know if we can get him a machine

## 2017-10-05 RX ORDER — ALBUTEROL SULFATE 90 UG/1
2 AEROSOL, METERED RESPIRATORY (INHALATION) EVERY 4 HOURS PRN
Qty: 18 G | Refills: 2 | Status: SHIPPED | OUTPATIENT
Start: 2017-10-05 | End: 2017-10-31

## 2017-10-16 ENCOUNTER — TELEPHONE (OUTPATIENT)
Dept: INTERNAL MEDICINE CLINIC | Facility: CLINIC | Age: 57
End: 2017-10-16

## 2017-10-16 NOTE — TELEPHONE ENCOUNTER
Patient states the ball of his feet feel as if the bones are crushed. When he bends over he can't breath. He says he is out of all his medication and can not afford to pay for it. He needs lidocaine ointment, Norco and liquid albuterol. Please call.

## 2017-10-17 ENCOUNTER — TELEPHONE (OUTPATIENT)
Dept: INTERNAL MEDICINE CLINIC | Facility: CLINIC | Age: 57
End: 2017-10-17

## 2017-10-17 NOTE — TELEPHONE ENCOUNTER
Patient is now straight medicaid, can be seen.   Meds need prior auths  Norco,Advair, Lidocaine, Albuterol solution, nebulizer

## 2017-10-17 NOTE — TELEPHONE ENCOUNTER
Pt's  verified  Pt calling re: pain worsening- Pt states no insurance coverage until 17- would like a call back from RN or Dr. Desai Ped

## 2017-10-19 NOTE — TELEPHONE ENCOUNTER
Spoke with the patient he understands to go to the emergency department but he does not like the treatment he receives at the ED in his area, advised that he has to go if he gets worse.

## 2017-10-31 ENCOUNTER — OFFICE VISIT (OUTPATIENT)
Dept: INTERNAL MEDICINE CLINIC | Facility: CLINIC | Age: 57
End: 2017-10-31

## 2017-10-31 VITALS
SYSTOLIC BLOOD PRESSURE: 148 MMHG | HEIGHT: 69 IN | DIASTOLIC BLOOD PRESSURE: 96 MMHG | RESPIRATION RATE: 20 BRPM | OXYGEN SATURATION: 99 % | WEIGHT: 214 LBS | BODY MASS INDEX: 31.7 KG/M2 | HEART RATE: 94 BPM

## 2017-10-31 DIAGNOSIS — R68.89 EXERCISE INTOLERANCE: Primary | ICD-10-CM

## 2017-10-31 DIAGNOSIS — S96.911A RIGHT FOOT STRAIN, INITIAL ENCOUNTER: ICD-10-CM

## 2017-10-31 DIAGNOSIS — Z01.00 ENCOUNTER FOR VISION SCREENING: ICD-10-CM

## 2017-10-31 DIAGNOSIS — Q76.1 CERVICAL FUSION SYNDROME: ICD-10-CM

## 2017-10-31 DIAGNOSIS — M51.16 LUMBAR DISC DISEASE WITH RADICULOPATHY: ICD-10-CM

## 2017-10-31 DIAGNOSIS — I10 HTN, GOAL BELOW 140/90: ICD-10-CM

## 2017-10-31 DIAGNOSIS — E78.2 MIXED HYPERLIPIDEMIA: ICD-10-CM

## 2017-10-31 DIAGNOSIS — J44.9 ASTHMA WITH COPD (CHRONIC OBSTRUCTIVE PULMONARY DISEASE) (HCC): ICD-10-CM

## 2017-10-31 PROCEDURE — 99214 OFFICE O/P EST MOD 30 MIN: CPT | Performed by: FAMILY MEDICINE

## 2017-10-31 PROCEDURE — 80061 LIPID PANEL: CPT | Performed by: FAMILY MEDICINE

## 2017-10-31 PROCEDURE — 80053 COMPREHEN METABOLIC PANEL: CPT | Performed by: FAMILY MEDICINE

## 2017-10-31 RX ORDER — MELOXICAM 15 MG/1
15 TABLET ORAL DAILY
Qty: 30 TABLET | Refills: 2 | Status: SHIPPED | OUTPATIENT
Start: 2017-10-31 | End: 2018-02-10

## 2017-10-31 RX ORDER — MONTELUKAST SODIUM 10 MG/1
10 TABLET ORAL DAILY
Qty: 90 TABLET | Refills: 3 | Status: SHIPPED | OUTPATIENT
Start: 2017-10-31 | End: 2018-11-09

## 2017-10-31 RX ORDER — HYDROCODONE BITARTRATE AND ACETAMINOPHEN 10; 325 MG/1; MG/1
1 TABLET ORAL EVERY 4 HOURS PRN
Qty: 180 TABLET | Refills: 0 | Status: SHIPPED | OUTPATIENT
Start: 2017-10-31 | End: 2017-11-30

## 2017-10-31 RX ORDER — PREGABALIN 150 MG/1
150 CAPSULE ORAL 2 TIMES DAILY
Qty: 60 CAPSULE | Refills: 2 | Status: SHIPPED | OUTPATIENT
Start: 2017-10-31 | End: 2017-12-15

## 2017-10-31 RX ORDER — LISINOPRIL 10 MG/1
10 TABLET ORAL 2 TIMES DAILY
Qty: 180 TABLET | Refills: 1 | Status: SHIPPED | OUTPATIENT
Start: 2017-10-31 | End: 2018-05-02

## 2017-10-31 RX ORDER — ALBUTEROL SULFATE 90 UG/1
2 AEROSOL, METERED RESPIRATORY (INHALATION) EVERY 4 HOURS PRN
Qty: 18 G | Refills: 2 | Status: SHIPPED | OUTPATIENT
Start: 2017-10-31 | End: 2017-11-09

## 2017-11-01 RX ORDER — ASPIRIN 81 MG/1
81 TABLET ORAL DAILY
Qty: 360 TABLET | Refills: 0 | Status: ON HOLD | OUTPATIENT
Start: 2017-11-01 | End: 2018-10-01

## 2017-11-01 NOTE — PROGRESS NOTES
CC:  Breathing Problem (Pt presents to clinic for c/o SOB and chest pressure on and off x couple of days. )      Hx of CC:  H/O BECOMING SOB AND SWEATS WITH EXERTION - CHRONIC X MONTHS. HAS NOTICED BREATHING GRADUALLY WORSE AS WELL.   HAD ADVAIR DISKUS AND Dispense: 18 g; Refill: 2  - Montelukast Sodium 10 MG Oral Tab; Take 1 tablet (10 mg total) by mouth daily. Dispense: 90 tablet; Refill: 3    4.  Exercise intolerance  UNCLEAR IF CARDIOGENIC OR PULMONARY ETIOLOGY, HOWEVER, GIVEN SEVERAL CORONARY RISK FACTO

## 2017-11-08 ENCOUNTER — TELEPHONE (OUTPATIENT)
Dept: INTERNAL MEDICINE CLINIC | Facility: CLINIC | Age: 57
End: 2017-11-08

## 2017-11-08 NOTE — TELEPHONE ENCOUNTER
Patient would like to go into detail about old RX vs new RX and several PA's. Patient would also like recent lab results. Would like to speak to someone tomorrow.

## 2017-11-09 ENCOUNTER — TELEPHONE (OUTPATIENT)
Dept: INTERNAL MEDICINE CLINIC | Facility: CLINIC | Age: 57
End: 2017-11-09

## 2017-11-09 DIAGNOSIS — I10 HYPERTENSION GOAL BP (BLOOD PRESSURE) < 140/90: ICD-10-CM

## 2017-11-09 DIAGNOSIS — J45.41 MODERATE PERSISTENT ASTHMA WITH ACUTE EXACERBATION: ICD-10-CM

## 2017-11-09 DIAGNOSIS — R68.89 EXERCISE INTOLERANCE: ICD-10-CM

## 2017-11-09 DIAGNOSIS — Z01.00 ENCOUNTER FOR OPHTHALMIC EXAMINATION AND EVALUATION: ICD-10-CM

## 2017-11-09 DIAGNOSIS — M47.812 OSTEOARTHRITIS OF CERVICAL SPINE, UNSPECIFIED SPINAL OSTEOARTHRITIS COMPLICATION STATUS: ICD-10-CM

## 2017-11-09 DIAGNOSIS — E78.2 MIXED HYPERLIPIDEMIA: ICD-10-CM

## 2017-11-09 DIAGNOSIS — J44.9 CHRONIC OBSTRUCTIVE PULMONARY DISEASE, UNSPECIFIED COPD TYPE (HCC): Primary | ICD-10-CM

## 2017-11-09 DIAGNOSIS — J30.1 SEASONAL ALLERGIC RHINITIS DUE TO POLLEN, UNSPECIFIED CHRONICITY: ICD-10-CM

## 2017-11-09 DIAGNOSIS — R51.9 RIGHT-SIDED HEADACHE: ICD-10-CM

## 2017-11-09 DIAGNOSIS — J44.9 ASTHMA WITH COPD (CHRONIC OBSTRUCTIVE PULMONARY DISEASE) (HCC): ICD-10-CM

## 2017-11-09 RX ORDER — DOXYCYCLINE HYCLATE 100 MG/1
CAPSULE ORAL
Refills: 0 | COMMUNITY
Start: 2017-09-08 | End: 2017-12-15 | Stop reason: ALTCHOICE

## 2017-11-09 RX ORDER — FLUTICASONE PROPIONATE AND SALMETEROL 232; 14 UG/1; UG/1
1 POWDER, METERED RESPIRATORY (INHALATION) 2 TIMES DAILY
Qty: 1 EACH | Refills: 6 | Status: SHIPPED | OUTPATIENT
Start: 2017-11-09 | End: 2018-02-28 | Stop reason: RX

## 2017-11-09 RX ORDER — BUPROPION HYDROCHLORIDE 150 MG/1
TABLET, EXTENDED RELEASE ORAL
Refills: 0 | COMMUNITY
Start: 2017-09-02 | End: 2017-12-15 | Stop reason: ALTCHOICE

## 2017-11-09 RX ORDER — LIDOCAINE 50 MG/G
1 OINTMENT TOPICAL 2 TIMES DAILY
Qty: 50 G | Refills: 6 | Status: SHIPPED | OUTPATIENT
Start: 2017-11-09 | End: 2017-12-15 | Stop reason: ALTCHOICE

## 2017-11-09 RX ORDER — ALBUTEROL SULFATE 2.5 MG/3ML
SOLUTION RESPIRATORY (INHALATION)
Refills: 5 | COMMUNITY
Start: 2017-09-01 | End: 2020-01-17

## 2017-11-09 RX ORDER — ALBUTEROL SULFATE 90 UG/1
2 AEROSOL, METERED RESPIRATORY (INHALATION) EVERY 4 HOURS PRN
Qty: 1 INHALER | Refills: 6 | Status: SHIPPED | OUTPATIENT
Start: 2017-11-09 | End: 2018-05-03

## 2017-11-09 NOTE — TELEPHONE ENCOUNTER
Pt's  verified  Pt states wants a call back from Emilia CARRANZA- states all meds are needing PA- ventillator as well.  Pt states-has been having burning in his stomach - refusing to go to ER states he is [place] but they wont cover his bills with his insurance

## 2017-11-09 NOTE — TELEPHONE ENCOUNTER
Patient worked out in the yard yesterday raking leaves and woke today feeling very congested with the burning sensation in his lungs, Asked the patient when he was going to schedule the Stress test to determine if his issues are cardiac or pulmonary in ord

## 2017-11-09 NOTE — TELEPHONE ENCOUNTER
[facility] insurance now, corrected medications sent to the pharmacy,  New order placed for Nebulizer and re-entered other referrals for authorization

## 2017-11-13 ENCOUNTER — TELEPHONE (OUTPATIENT)
Dept: INTERNAL MEDICINE CLINIC | Facility: CLINIC | Age: 57
End: 2017-11-13

## 2017-11-13 NOTE — TELEPHONE ENCOUNTER
Per [employer] patient has to file an appeal due top the fact that they have the ICD 10 as nerve damage, but patient has neuralgia pain   used in conjunction with oral medications to relieve the burning nerve pain in his back

## 2017-11-13 NOTE — TELEPHONE ENCOUNTER
Spoke to [employer] regarding signing the Appeal paperwork to appeal the medication decision

## 2017-11-13 NOTE — TELEPHONE ENCOUNTER
Sent additional chart notes to [facility] at [facility] regarding the patient having COPD/Asthma

## 2017-11-14 NOTE — TELEPHONE ENCOUNTER
Requesting refill for Voltaren cream 1% pharmacy suggested until the Lidocaine can be approved for the patient

## 2017-11-24 DIAGNOSIS — K22.719 BARRETT'S ESOPHAGUS WITH DYSPLASIA: ICD-10-CM

## 2017-11-27 ENCOUNTER — TELEPHONE (OUTPATIENT)
Dept: INTERNAL MEDICINE CLINIC | Facility: CLINIC | Age: 57
End: 2017-11-27

## 2017-11-27 DIAGNOSIS — K21.9 GASTROESOPHAGEAL REFLUX DISEASE, ESOPHAGITIS PRESENCE NOT SPECIFIED: Primary | ICD-10-CM

## 2017-11-27 DIAGNOSIS — K22.719 BARRETT'S ESOPHAGUS WITH DYSPLASIA: ICD-10-CM

## 2017-11-27 RX ORDER — OMEPRAZOLE 40 MG/1
40 CAPSULE, DELAYED RELEASE ORAL DAILY
Qty: 90 CAPSULE | Refills: 1 | Status: SHIPPED | OUTPATIENT
Start: 2017-11-27 | End: 2018-05-03

## 2017-11-27 RX ORDER — OMEPRAZOLE 40 MG/1
CAPSULE, DELAYED RELEASE ORAL
Qty: 90 CAPSULE | Refills: 1 | Status: SHIPPED | OUTPATIENT
Start: 2017-11-27 | End: 2017-11-27

## 2017-11-27 NOTE — TELEPHONE ENCOUNTER
Pt's  verified  Pt called l/m on nurse line wanting to talk to RN or MD re: phmclizbeth telling him GERD meds have  needs new script sent

## 2017-12-02 DIAGNOSIS — R51.9 RIGHT-SIDED HEADACHE: ICD-10-CM

## 2017-12-02 DIAGNOSIS — M47.812 OSTEOARTHRITIS OF CERVICAL SPINE, UNSPECIFIED SPINAL OSTEOARTHRITIS COMPLICATION STATUS: ICD-10-CM

## 2017-12-02 RX ORDER — CYCLOBENZAPRINE HCL 10 MG
TABLET ORAL
Qty: 90 TABLET | Refills: 0 | Status: SHIPPED | OUTPATIENT
Start: 2017-12-02 | End: 2018-01-06

## 2017-12-04 ENCOUNTER — TELEPHONE (OUTPATIENT)
Dept: INTERNAL MEDICINE CLINIC | Facility: CLINIC | Age: 57
End: 2017-12-04

## 2017-12-04 NOTE — TELEPHONE ENCOUNTER
Patient has the stress test scheduled on Wednesday,     Patient fell down carpeted stairs on Friday while her was at a  he grabbed the rail and it pulled his right shoulder and hurts as he stretched down through his groin.  He is having difficulty ev

## 2017-12-07 ENCOUNTER — TELEPHONE (OUTPATIENT)
Dept: INTERNAL MEDICINE CLINIC | Facility: CLINIC | Age: 57
End: 2017-12-07

## 2017-12-07 NOTE — TELEPHONE ENCOUNTER
Pt's  verified  Pt calling states he is having second thoughts about getting the stress test that was ordered done= would like a call back from Dr. Omar Francisco or Sarah Thurman RN

## 2017-12-15 ENCOUNTER — TELEPHONE (OUTPATIENT)
Dept: INTERNAL MEDICINE CLINIC | Facility: CLINIC | Age: 57
End: 2017-12-15

## 2017-12-15 ENCOUNTER — OFFICE VISIT (OUTPATIENT)
Dept: INTERNAL MEDICINE CLINIC | Facility: CLINIC | Age: 57
End: 2017-12-15

## 2017-12-15 VITALS
OXYGEN SATURATION: 99 % | WEIGHT: 231 LBS | DIASTOLIC BLOOD PRESSURE: 78 MMHG | BODY MASS INDEX: 34.21 KG/M2 | HEIGHT: 69 IN | SYSTOLIC BLOOD PRESSURE: 136 MMHG | RESPIRATION RATE: 18 BRPM | HEART RATE: 82 BPM

## 2017-12-15 DIAGNOSIS — M50.20 CERVICAL HERNIATED DISC: ICD-10-CM

## 2017-12-15 DIAGNOSIS — M51.16 LUMBAR DISC DISEASE WITH RADICULOPATHY: ICD-10-CM

## 2017-12-15 DIAGNOSIS — Z23 NEED FOR STREPTOCOCCUS PNEUMONIAE VACCINATION: ICD-10-CM

## 2017-12-15 DIAGNOSIS — J44.9 ASTHMA WITH COPD (CHRONIC OBSTRUCTIVE PULMONARY DISEASE) (HCC): ICD-10-CM

## 2017-12-15 DIAGNOSIS — L73.9 FOLLICULITIS: ICD-10-CM

## 2017-12-15 DIAGNOSIS — M47.812 OSTEOARTHRITIS OF CERVICAL SPINE, UNSPECIFIED SPINAL OSTEOARTHRITIS COMPLICATION STATUS: ICD-10-CM

## 2017-12-15 DIAGNOSIS — R07.89 OTHER CHEST PAIN: ICD-10-CM

## 2017-12-15 DIAGNOSIS — I10 HTN, GOAL BELOW 140/90: Primary | ICD-10-CM

## 2017-12-15 DIAGNOSIS — M50.90 CERVICAL DISC DISEASE: ICD-10-CM

## 2017-12-15 DIAGNOSIS — E78.2 MIXED HYPERLIPIDEMIA: ICD-10-CM

## 2017-12-15 PROCEDURE — 90471 IMMUNIZATION ADMIN: CPT | Performed by: FAMILY MEDICINE

## 2017-12-15 PROCEDURE — 96372 THER/PROPH/DIAG INJ SC/IM: CPT | Performed by: FAMILY MEDICINE

## 2017-12-15 PROCEDURE — 90732 PPSV23 VACC 2 YRS+ SUBQ/IM: CPT | Performed by: FAMILY MEDICINE

## 2017-12-15 PROCEDURE — 99214 OFFICE O/P EST MOD 30 MIN: CPT | Performed by: FAMILY MEDICINE

## 2017-12-15 RX ORDER — PREGABALIN 150 MG/1
150 CAPSULE ORAL 2 TIMES DAILY
Qty: 60 CAPSULE | Refills: 2 | Status: SHIPPED | OUTPATIENT
Start: 2017-12-15 | End: 2018-01-22

## 2017-12-15 RX ORDER — HYDROCODONE BITARTRATE AND ACETAMINOPHEN 10; 325 MG/1; MG/1
1 TABLET ORAL EVERY 4 HOURS PRN
Qty: 150 TABLET | Refills: 0 | Status: SHIPPED | OUTPATIENT
Start: 2017-12-15 | End: 2018-01-14

## 2017-12-15 RX ORDER — KETOROLAC TROMETHAMINE 30 MG/ML
60 INJECTION, SOLUTION INTRAMUSCULAR; INTRAVENOUS ONCE
Status: COMPLETED | OUTPATIENT
Start: 2017-12-15 | End: 2017-12-15

## 2017-12-15 RX ORDER — HYDROCODONE BITARTRATE AND ACETAMINOPHEN 10; 325 MG/1; MG/1
1 TABLET ORAL EVERY 4 HOURS PRN
Qty: 150 TABLET | Refills: 0 | Status: SHIPPED | OUTPATIENT
Start: 2017-12-15 | End: 2017-12-15 | Stop reason: CLARIF

## 2017-12-15 RX ORDER — ATORVASTATIN CALCIUM 40 MG/1
40 TABLET, FILM COATED ORAL NIGHTLY
Qty: 90 TABLET | Refills: 3 | Status: SHIPPED | OUTPATIENT
Start: 2017-12-15 | End: 2018-05-02

## 2017-12-15 RX ORDER — HYDROCODONE BITARTRATE AND ACETAMINOPHEN 10; 325 MG/1; MG/1
1 TABLET ORAL EVERY 4 HOURS PRN
Qty: 150 TABLET | Refills: 0 | Status: SHIPPED | OUTPATIENT
Start: 2018-01-14 | End: 2018-02-13

## 2017-12-15 RX ORDER — HYDROCODONE BITARTRATE AND ACETAMINOPHEN 10; 325 MG/1; MG/1
1 TABLET ORAL EVERY 4 HOURS PRN
Qty: 150 TABLET | Refills: 0 | Status: SHIPPED | OUTPATIENT
Start: 2018-02-13 | End: 2018-03-15

## 2017-12-15 NOTE — PATIENT INSTRUCTIONS
GET LEXISCAN (CARDIAC STRESS TEST) THIS COMING WED 12/20 @ 11:30 AM, [facility] ([facility] [facility]/MAIN ENTRANCE).

## 2017-12-15 NOTE — PROGRESS NOTES
CC:  Follow - Up (Pt presents to clinic for routine f/up on all conditions and medications. )      Hx of CC:  F/UP ON HTN/LIPIDS/ASTHMA WITH COPD AND EPISODIC CHEST PAIN--ORIGINALLY MORE RIGHT SIDED BUT NOW FEELS \"TWITCHING\" ON CHEST WALL AT TIMES.   HAD tablet by mouth every 4 (four) hours as needed for Pain. Dispense: 150 tablet; Refill: 0  - HYDROcodone-acetaminophen  MG Oral Tab; Take 1 tablet by mouth every 4 (four) hours as needed for Pain. Dispense: 150 tablet;  Refill: 0  - HYDROcodone-aceta for Pain. Dispense: 150 tablet; Refill: 0  - HYDROcodone-acetaminophen  MG Oral Tab; Take 1 tablet by mouth every 4 (four) hours as needed for Pain. Dispense: 150 tablet; Refill: 0    10.  Osteoarthritis of cervical spine, unspecified spinal osteoar

## 2017-12-15 NOTE — TELEPHONE ENCOUNTER
Pt's  verified Pt calling upset states that he changed insurance coverage to [employer] - upset that most of his meds are not covered by [employer] and he has had to change so many of them , Pt states he was supposed to come and fill out forms- was t

## 2017-12-19 ENCOUNTER — TELEPHONE (OUTPATIENT)
Dept: INTERNAL MEDICINE CLINIC | Facility: CLINIC | Age: 57
End: 2017-12-19

## 2017-12-19 RX ORDER — DULOXETIN HYDROCHLORIDE 20 MG/1
20 CAPSULE, DELAYED RELEASE ORAL DAILY
Qty: 30 CAPSULE | Refills: 1 | Status: SHIPPED | OUTPATIENT
Start: 2017-12-19 | End: 2018-02-15 | Stop reason: DRUGHIGH

## 2017-12-19 NOTE — TELEPHONE ENCOUNTER
Patient has to fail five formulary medications before appeal for non formulary medication will be considered

## 2017-12-19 NOTE — TELEPHONE ENCOUNTER
Pt's  verified Pt requesting a call from Emilia sorenson: legs causing pain feels like a dagger is stabbing  Into them

## 2017-12-19 NOTE — TELEPHONE ENCOUNTER
Spoke with the patient he was very frustrated as he had dealt with the public [employer] organization and got a badgering from them with options [place] live in the projects, patient then received notice that the Lyrica medication was not going to be covered.  He c

## 2017-12-22 ENCOUNTER — TELEPHONE (OUTPATIENT)
Dept: INTERNAL MEDICINE CLINIC | Facility: CLINIC | Age: 57
End: 2017-12-22

## 2017-12-22 NOTE — TELEPHONE ENCOUNTER
Patient stating he developed bad stomach pain and started with cramping , then bad gas and passed dark loose stool, he is thinking it is the two new medications, explained to him usually not after just two doses these meds should not cause dark loose stool

## 2017-12-29 ENCOUNTER — TELEPHONE (OUTPATIENT)
Dept: INTERNAL MEDICINE CLINIC | Facility: CLINIC | Age: 57
End: 2017-12-29

## 2017-12-29 DIAGNOSIS — J44.9 ASTHMA WITH COPD (CHRONIC OBSTRUCTIVE PULMONARY DISEASE) (HCC): Primary | ICD-10-CM

## 2017-12-29 DIAGNOSIS — J06.9 VIRAL UPPER RESPIRATORY TRACT INFECTION: ICD-10-CM

## 2017-12-29 RX ORDER — SIMVASTATIN 40 MG
TABLET ORAL
Refills: 0 | COMMUNITY
Start: 2017-12-04 | End: 2018-02-15 | Stop reason: ALTCHOICE

## 2017-12-29 RX ORDER — BUPROPION HYDROCHLORIDE 150 MG/1
TABLET, EXTENDED RELEASE ORAL
Refills: 0 | COMMUNITY
Start: 2017-12-02 | End: 2018-02-15 | Stop reason: ALTCHOICE

## 2017-12-29 RX ORDER — PREDNISONE 20 MG/1
20 TABLET ORAL DAILY
Qty: 7 TABLET | Refills: 0 | Status: SHIPPED | OUTPATIENT
Start: 2017-12-29 | End: 2018-01-05

## 2017-12-29 NOTE — TELEPHONE ENCOUNTER
Spoke with patient as he is not feeling well has a cough and chest congestion. Advised he needed to be seen either appt here walkin clinic or the immediate care for evaluation and treatment.   Patient was exposed to family with the flu recently over the hol

## 2018-01-06 DIAGNOSIS — R51.9 RIGHT-SIDED HEADACHE: ICD-10-CM

## 2018-01-06 DIAGNOSIS — M47.812 OSTEOARTHRITIS OF CERVICAL SPINE, UNSPECIFIED SPINAL OSTEOARTHRITIS COMPLICATION STATUS: ICD-10-CM

## 2018-01-08 RX ORDER — CYCLOBENZAPRINE HCL 10 MG
TABLET ORAL
Qty: 90 TABLET | Refills: 2 | Status: SHIPPED | OUTPATIENT
Start: 2018-01-08 | End: 2018-04-02

## 2018-01-22 ENCOUNTER — TELEPHONE (OUTPATIENT)
Dept: INTERNAL MEDICINE CLINIC | Facility: CLINIC | Age: 58
End: 2018-01-22

## 2018-01-22 DIAGNOSIS — M51.16 LUMBAR DISC DISEASE WITH RADICULOPATHY: Primary | ICD-10-CM

## 2018-01-22 RX ORDER — TOPIRAMATE 25 MG/1
TABLET ORAL
Refills: 1 | COMMUNITY
Start: 2018-01-06 | End: 2018-08-09

## 2018-01-22 RX ORDER — GABAPENTIN 300 MG/1
300 CAPSULE ORAL 2 TIMES DAILY PRN
Qty: 60 CAPSULE | Refills: 0 | Status: SHIPPED | OUTPATIENT
Start: 2018-01-22 | End: 2018-02-15 | Stop reason: SINTOL

## 2018-01-22 RX ORDER — PREGABALIN 150 MG/1
150 CAPSULE ORAL 2 TIMES DAILY
Qty: 60 CAPSULE | Refills: 2 | Status: SHIPPED | OUTPATIENT
Start: 2018-01-22 | End: 2018-02-15

## 2018-01-23 DIAGNOSIS — L73.9 FOLLICULITIS: ICD-10-CM

## 2018-01-23 NOTE — TELEPHONE ENCOUNTER
PATIENT CALLED WITH C/O INCREASING LEG NUMBNESS AND BURNING SENSATION (SLIPPED ON THIN CARPETING AT [address] [facility]). INSURANCE HAS DENIED LYRICA RX-->PATIENT IS APPEALING. WILL PRESCRIBE GABAPENTIN 300 MG BID PRN FOR NOW.   SENT WRITTEN

## 2018-02-05 ENCOUNTER — TELEPHONE (OUTPATIENT)
Dept: INTERNAL MEDICINE CLINIC | Facility: CLINIC | Age: 58
End: 2018-02-05

## 2018-02-05 RX ORDER — FUROSEMIDE 20 MG/1
20 TABLET ORAL 2 TIMES DAILY
Qty: 60 TABLET | Refills: 0 | Status: SHIPPED | OUTPATIENT
Start: 2018-02-05 | End: 2018-03-07

## 2018-02-05 NOTE — TELEPHONE ENCOUNTER
Gabapentin is causing the patient gastric distress has diarrhea off and on for 2 weeks, Patient is bloated has edema in hand s and legs. Legs have large pits  anf hands are so swollen creases look like slashes in his hands.  Patient has appointment on 2/15

## 2018-02-05 NOTE — TELEPHONE ENCOUNTER
Pt want like Emilia to give him a call back regarding the swelling in palm of  both hands, feet, and stomach. Pt is swollen everywhere. Also, bad diarrhea.

## 2018-02-05 NOTE — TELEPHONE ENCOUNTER
20mg Lasix sent per Dr Mayra Sidhu, BID for reducing the swelling.  Resubmitted the lyrica for prior auth

## 2018-02-10 DIAGNOSIS — M51.16 LUMBAR DISC DISEASE WITH RADICULOPATHY: ICD-10-CM

## 2018-02-10 DIAGNOSIS — S96.911A RIGHT FOOT STRAIN, INITIAL ENCOUNTER: ICD-10-CM

## 2018-02-12 RX ORDER — MELOXICAM 15 MG/1
TABLET ORAL
Qty: 30 TABLET | Refills: 0 | Status: SHIPPED | OUTPATIENT
Start: 2018-02-12 | End: 2018-04-06

## 2018-02-15 ENCOUNTER — OFFICE VISIT (OUTPATIENT)
Dept: INTERNAL MEDICINE CLINIC | Facility: CLINIC | Age: 58
End: 2018-02-15

## 2018-02-15 VITALS
OXYGEN SATURATION: 98 % | TEMPERATURE: 98 F | RESPIRATION RATE: 18 BRPM | BODY MASS INDEX: 33.62 KG/M2 | DIASTOLIC BLOOD PRESSURE: 80 MMHG | WEIGHT: 227 LBS | HEIGHT: 69 IN | HEART RATE: 93 BPM | SYSTOLIC BLOOD PRESSURE: 144 MMHG

## 2018-02-15 DIAGNOSIS — R60.0 EDEMA OF BOTH LEGS: ICD-10-CM

## 2018-02-15 DIAGNOSIS — M51.16 LUMBAR DISC DISEASE WITH RADICULOPATHY: ICD-10-CM

## 2018-02-15 DIAGNOSIS — J44.9 ASTHMA WITH COPD (CHRONIC OBSTRUCTIVE PULMONARY DISEASE) (HCC): ICD-10-CM

## 2018-02-15 DIAGNOSIS — M79.7 FIBROMYALGIA AFFECTING MULTIPLE SITES: ICD-10-CM

## 2018-02-15 DIAGNOSIS — M50.90 CERVICAL BACK PAIN WITH EVIDENCE OF DISC DISEASE: Primary | ICD-10-CM

## 2018-02-15 DIAGNOSIS — I10 HTN, GOAL BELOW 140/90: ICD-10-CM

## 2018-02-15 DIAGNOSIS — E78.2 MIXED HYPERLIPIDEMIA: ICD-10-CM

## 2018-02-15 PROCEDURE — 99214 OFFICE O/P EST MOD 30 MIN: CPT | Performed by: FAMILY MEDICINE

## 2018-02-15 RX ORDER — PREGABALIN 150 MG/1
150 CAPSULE ORAL 2 TIMES DAILY
Qty: 60 CAPSULE | Refills: 2 | Status: SHIPPED | OUTPATIENT
Start: 2018-02-15 | End: 2018-05-02

## 2018-02-15 RX ORDER — DULOXETINE 40 MG/1
1 CAPSULE, DELAYED RELEASE ORAL DAILY
Qty: 30 CAPSULE | Refills: 2 | Status: SHIPPED | OUTPATIENT
Start: 2018-02-15 | End: 2018-03-17

## 2018-02-16 NOTE — PROGRESS NOTES
CC:  Swelling (Pt here c/o swelling in bilateral hands and legs)      Hx of CC:  C/O DISTAL LEG AND HAND SWELLING WHICH HAS IMPROVED SOME ON LASIX.   ALSO FOLLOWING UP ON CHRONIC NECK/LOW BACK DISC DISEASE AND RADICULOPATHY-->GETTING GAGGING EPISODES FOR SE capsule by mouth daily. Dispense: 30 capsule; Refill: 2    2. Lumbar disc disease with radiculopathy  NORCO PRN AND AS PER CC  - pregabalin (LYRICA) 150 MG Oral Cap; Take 1 capsule (150 mg total) by mouth 2 (two) times daily. Dispense: 60 capsule;  Refill

## 2018-02-20 ENCOUNTER — TELEPHONE (OUTPATIENT)
Dept: INTERNAL MEDICINE CLINIC | Facility: CLINIC | Age: 58
End: 2018-02-20

## 2018-02-26 ENCOUNTER — TELEPHONE (OUTPATIENT)
Dept: INTERNAL MEDICINE CLINIC | Facility: CLINIC | Age: 58
End: 2018-02-26

## 2018-02-26 RX ORDER — DULOXETIN HYDROCHLORIDE 20 MG/1
20 CAPSULE, DELAYED RELEASE ORAL DAILY
Qty: 30 CAPSULE | Refills: 2 | OUTPATIENT
Start: 2018-02-26

## 2018-02-27 NOTE — TELEPHONE ENCOUNTER
Curly Kiran Gift no peer to peer at this time until patient completes the cardiac stress test and necessary workups as any imaging could not supprot surgery at this time until completion of the requested workups , patient notified to complete the stress test as

## 2018-02-28 ENCOUNTER — TELEPHONE (OUTPATIENT)
Dept: INTERNAL MEDICINE CLINIC | Facility: CLINIC | Age: 58
End: 2018-02-28

## 2018-02-28 NOTE — TELEPHONE ENCOUNTER
Advised to go to immediate care for evaluation and treatment, he does not want to be sick for the weekend has an  Important event he is trying to get a ride to go.  Patient only has albuterol inhaler  Needs refill of the other

## 2018-03-01 DIAGNOSIS — J44.9 ASTHMA WITH COPD (CHRONIC OBSTRUCTIVE PULMONARY DISEASE) (HCC): Primary | ICD-10-CM

## 2018-03-02 ENCOUNTER — TELEPHONE (OUTPATIENT)
Dept: INTERNAL MEDICINE CLINIC | Facility: CLINIC | Age: 58
End: 2018-03-02

## 2018-03-07 RX ORDER — FUROSEMIDE 20 MG/1
TABLET ORAL
Qty: 60 TABLET | Refills: 0 | Status: SHIPPED | OUTPATIENT
Start: 2018-03-07 | End: 2018-11-09 | Stop reason: ALTCHOICE

## 2018-03-21 ENCOUNTER — TELEPHONE (OUTPATIENT)
Dept: INTERNAL MEDICINE CLINIC | Facility: CLINIC | Age: 58
End: 2018-03-21

## 2018-03-21 NOTE — TELEPHONE ENCOUNTER
Explained to patient we have to have the cardiac test completed before he can have any kind of surgery and the necessity for the imaging  He is calling Sara Sutton to see if he can have the stress test at [facility] which is closer to the patient

## 2018-04-02 ENCOUNTER — TELEPHONE (OUTPATIENT)
Dept: INTERNAL MEDICINE CLINIC | Facility: CLINIC | Age: 58
End: 2018-04-02

## 2018-04-02 DIAGNOSIS — R51.9 RIGHT-SIDED HEADACHE: ICD-10-CM

## 2018-04-02 DIAGNOSIS — J30.1 SEASONAL ALLERGIC RHINITIS DUE TO POLLEN: ICD-10-CM

## 2018-04-02 DIAGNOSIS — M47.812 OSTEOARTHRITIS OF CERVICAL SPINE, UNSPECIFIED SPINAL OSTEOARTHRITIS COMPLICATION STATUS: ICD-10-CM

## 2018-04-02 RX ORDER — FLUTICASONE PROPIONATE 50 MCG
SPRAY, SUSPENSION (ML) NASAL
Qty: 1 BOTTLE | Refills: 0 | Status: SHIPPED | OUTPATIENT
Start: 2018-04-02 | End: 2018-05-02

## 2018-04-02 RX ORDER — TRAZODONE HYDROCHLORIDE 100 MG/1
TABLET ORAL
Qty: 30 TABLET | Refills: 0 | Status: SHIPPED | OUTPATIENT
Start: 2018-04-02 | End: 2018-05-02

## 2018-04-02 RX ORDER — CYCLOBENZAPRINE HCL 10 MG
TABLET ORAL
Qty: 90 TABLET | Refills: 0 | Status: SHIPPED | OUTPATIENT
Start: 2018-04-02 | End: 2018-05-02

## 2018-04-02 NOTE — TELEPHONE ENCOUNTER
Called pharmacy to verify they received script per pharmacist they do have script for Flonase    Called pt and left voicemail informing him flonase was at pharmacy

## 2018-04-06 DIAGNOSIS — M51.16 LUMBAR DISC DISEASE WITH RADICULOPATHY: ICD-10-CM

## 2018-04-06 DIAGNOSIS — S96.911A RIGHT FOOT STRAIN, INITIAL ENCOUNTER: ICD-10-CM

## 2018-04-06 RX ORDER — MELOXICAM 15 MG/1
TABLET ORAL
Qty: 30 TABLET | Refills: 1 | Status: SHIPPED | OUTPATIENT
Start: 2018-04-06 | End: 2018-05-02

## 2018-04-12 ENCOUNTER — TELEPHONE (OUTPATIENT)
Dept: INTERNAL MEDICINE CLINIC | Facility: CLINIC | Age: 58
End: 2018-04-12

## 2018-04-13 NOTE — TELEPHONE ENCOUNTER
Patient advised to go to the emergency department, per patient description he cannot walk on the leg, has swelling in the groin and above the knee.  Patients efforts to massage the leg bring no relief, advised he needs to have it seen to provide evaluation

## 2018-04-26 ENCOUNTER — TELEPHONE (OUTPATIENT)
Dept: INTERNAL MEDICINE CLINIC | Facility: CLINIC | Age: 58
End: 2018-04-26

## 2018-04-26 NOTE — TELEPHONE ENCOUNTER
Patient said pain in groin and hip gets worse when he wears dress shoes and does stairs, recommended not wearing dress shoes and avoiding stairs if pain or discomfort gets worse has to go to the emergency department.    Appt scheduled to have the Stress adri

## 2018-05-01 DIAGNOSIS — J45.41 MODERATE PERSISTENT ASTHMA WITH ACUTE EXACERBATION: ICD-10-CM

## 2018-05-02 DIAGNOSIS — I10 HTN, GOAL BELOW 140/90: ICD-10-CM

## 2018-05-02 DIAGNOSIS — K21.9 GASTROESOPHAGEAL REFLUX DISEASE, ESOPHAGITIS PRESENCE NOT SPECIFIED: ICD-10-CM

## 2018-05-02 DIAGNOSIS — J30.1 SEASONAL ALLERGIC RHINITIS DUE TO POLLEN: ICD-10-CM

## 2018-05-02 DIAGNOSIS — M47.812 OSTEOARTHRITIS OF CERVICAL SPINE, UNSPECIFIED SPINAL OSTEOARTHRITIS COMPLICATION STATUS: ICD-10-CM

## 2018-05-02 DIAGNOSIS — S96.911A RIGHT FOOT STRAIN, INITIAL ENCOUNTER: ICD-10-CM

## 2018-05-02 DIAGNOSIS — M79.7 FIBROMYALGIA AFFECTING MULTIPLE SITES: ICD-10-CM

## 2018-05-02 DIAGNOSIS — E78.2 MIXED HYPERLIPIDEMIA: ICD-10-CM

## 2018-05-02 DIAGNOSIS — K22.719 BARRETT'S ESOPHAGUS WITH DYSPLASIA: ICD-10-CM

## 2018-05-02 DIAGNOSIS — M51.16 LUMBAR DISC DISEASE WITH RADICULOPATHY: ICD-10-CM

## 2018-05-02 DIAGNOSIS — M50.90 CERVICAL BACK PAIN WITH EVIDENCE OF DISC DISEASE: ICD-10-CM

## 2018-05-02 DIAGNOSIS — R51.9 RIGHT-SIDED HEADACHE: ICD-10-CM

## 2018-05-02 RX ORDER — FLUTICASONE PROPIONATE AND SALMETEROL 232; 14 UG/1; UG/1
1 POWDER, METERED RESPIRATORY (INHALATION) 2 TIMES DAILY
Qty: 1 EACH | Refills: 3 | Status: SHIPPED | OUTPATIENT
Start: 2018-05-02 | End: 2018-11-09

## 2018-05-03 ENCOUNTER — TELEPHONE (OUTPATIENT)
Dept: INTERNAL MEDICINE CLINIC | Facility: CLINIC | Age: 58
End: 2018-05-03

## 2018-05-03 DIAGNOSIS — K21.00 GERD WITH ESOPHAGITIS: Primary | ICD-10-CM

## 2018-05-03 DIAGNOSIS — K21.00 REFLUX ESOPHAGITIS: Primary | ICD-10-CM

## 2018-05-03 RX ORDER — MELOXICAM 15 MG/1
TABLET ORAL
Qty: 30 TABLET | Refills: 2 | Status: SHIPPED | OUTPATIENT
Start: 2018-05-03 | End: 2018-11-09

## 2018-05-03 RX ORDER — TRAZODONE HYDROCHLORIDE 100 MG/1
TABLET ORAL
Qty: 30 TABLET | Refills: 2 | Status: SHIPPED | OUTPATIENT
Start: 2018-05-03 | End: 2018-08-09

## 2018-05-03 RX ORDER — LISINOPRIL 10 MG/1
10 TABLET ORAL 2 TIMES DAILY
Qty: 60 TABLET | Refills: 11 | Status: SHIPPED | OUTPATIENT
Start: 2018-05-03 | End: 2018-11-09

## 2018-05-03 RX ORDER — RANITIDINE 300 MG/1
300 TABLET ORAL NIGHTLY
Qty: 30 TABLET | Refills: 5 | Status: SHIPPED | OUTPATIENT
Start: 2018-05-03 | End: 2018-11-09

## 2018-05-03 RX ORDER — PREGABALIN 150 MG/1
150 CAPSULE ORAL 2 TIMES DAILY
Qty: 60 CAPSULE | Refills: 2 | Status: SHIPPED | OUTPATIENT
Start: 2018-05-03 | End: 2018-08-16

## 2018-05-03 RX ORDER — ALBUTEROL SULFATE 90 UG/1
AEROSOL, METERED RESPIRATORY (INHALATION)
Qty: 18 G | Refills: 2 | Status: SHIPPED | OUTPATIENT
Start: 2018-05-03 | End: 2018-11-09

## 2018-05-03 RX ORDER — FLUTICASONE PROPIONATE 50 MCG
SPRAY, SUSPENSION (ML) NASAL
Qty: 1 BOTTLE | Refills: 5 | Status: SHIPPED | OUTPATIENT
Start: 2018-05-03 | End: 2019-01-09

## 2018-05-03 RX ORDER — CYCLOBENZAPRINE HCL 10 MG
TABLET ORAL
Qty: 90 TABLET | Refills: 2 | Status: SHIPPED | OUTPATIENT
Start: 2018-05-03 | End: 2018-11-08

## 2018-05-03 RX ORDER — ATORVASTATIN CALCIUM 40 MG/1
40 TABLET, FILM COATED ORAL NIGHTLY
Qty: 30 TABLET | Refills: 11 | Status: SHIPPED | OUTPATIENT
Start: 2018-05-03 | End: 2018-11-09

## 2018-05-03 RX ORDER — OMEPRAZOLE 40 MG/1
40 CAPSULE, DELAYED RELEASE ORAL DAILY
Qty: 90 CAPSULE | Refills: 1 | OUTPATIENT
Start: 2018-05-03

## 2018-06-07 ENCOUNTER — OFFICE VISIT (OUTPATIENT)
Dept: INTERNAL MEDICINE CLINIC | Facility: CLINIC | Age: 58
End: 2018-06-07

## 2018-06-07 VITALS — HEIGHT: 69 IN

## 2018-06-07 DIAGNOSIS — Z53.21 PROCEDURE AND TREATMENT NOT CARRIED OUT DUE TO PATIENT LEAVING PRIOR TO BEING SEEN BY HEALTH CARE PROVIDER: Primary | ICD-10-CM

## 2018-06-07 PROCEDURE — 99998 NO SHOW: CPT | Performed by: FAMILY MEDICINE

## 2018-06-13 ENCOUNTER — TELEPHONE (OUTPATIENT)
Dept: INTERNAL MEDICINE CLINIC | Facility: CLINIC | Age: 58
End: 2018-06-13

## 2018-06-13 DIAGNOSIS — M51.16 LUMBAR DISC DISEASE WITH RADICULOPATHY: Primary | ICD-10-CM

## 2018-06-13 DIAGNOSIS — J44.9 CHRONIC OBSTRUCTIVE PULMONARY DISEASE, UNSPECIFIED COPD TYPE (HCC): ICD-10-CM

## 2018-06-13 DIAGNOSIS — Z01.810 PRE-OPERATIVE CARDIOVASCULAR EXAMINATION: ICD-10-CM

## 2018-06-13 NOTE — TELEPHONE ENCOUNTER
Patient also has a large cyst that he opened under his left arm in the arm pit advised he should have that examined at the Emergency department or Immediate care near him for further evaluation and treatment, patient is also experiencing numbness in the le

## 2018-06-13 NOTE — TELEPHONE ENCOUNTER
[address] [facility]  Address: [address], [address] [facility], [address] [place], [place] [address]way [address] [place] [address]   Phone: [phone]  Patient was calling to see if he could get the stress test at this location due to his transportation issues, advised he should consult with his ins

## 2018-06-22 ENCOUNTER — TELEPHONE (OUTPATIENT)
Dept: INTERNAL MEDICINE CLINIC | Facility: CLINIC | Age: 58
End: 2018-06-22

## 2018-06-22 NOTE — TELEPHONE ENCOUNTER
Called pt informed him order was faxed and per central scheduling he has to call to set up appointment    Order faxed to [facility] at [phone]

## 2018-06-25 ENCOUNTER — TELEPHONE (OUTPATIENT)
Dept: INTERNAL MEDICINE CLINIC | Facility: CLINIC | Age: 58
End: 2018-06-25

## 2018-06-25 NOTE — TELEPHONE ENCOUNTER
Pt states that he fell down last week and was not in pain at the moment, but now has been having numbness in his hands after bracing himself from the fall. Would like some advice on what to do next.  Please call at [phone]

## 2018-07-16 NOTE — TELEPHONE ENCOUNTER
Just wants to make sure Dr. Qiana Taylor received his last message   Says his right arm and figures feel numb has tried to go to immediate cares or hospitals and they do not take his [facility] community.

## 2018-07-16 NOTE — TELEPHONE ENCOUNTER
Spoke with patient. Legs \"give out\" at times--falls. Has pharmacologic stress test scheduled with [facility] for 7/18/18. Then if that's ok will get surgery with Dr. Ashley berger.

## 2018-07-18 ENCOUNTER — HOSPITAL (OUTPATIENT)
Dept: OTHER | Age: 58
End: 2018-07-18
Attending: FAMILY MEDICINE

## 2018-07-20 ENCOUNTER — TELEPHONE (OUTPATIENT)
Dept: INTERNAL MEDICINE CLINIC | Facility: CLINIC | Age: 58
End: 2018-07-20

## 2018-07-20 NOTE — TELEPHONE ENCOUNTER
Pt's  verified  Called Pt and informed him that per Dr Dixie Raman we have not received esults- he stated they told him they would send within 3 days- - Pt states he has had excessive sweats and SOB has increased - per Dr Del Toro Hidden instructed Pt to use inhalers a

## 2018-07-20 NOTE — TELEPHONE ENCOUNTER
patient had chemical stress test on Wednesday 7/18/19 at [facility] in [facility] they are to fax results when Dr Frandy Alberto receives please call patient to discuss

## 2018-07-27 ENCOUNTER — TELEPHONE (OUTPATIENT)
Dept: INTERNAL MEDICINE CLINIC | Facility: CLINIC | Age: 58
End: 2018-07-27

## 2018-07-27 DIAGNOSIS — L03.115 CELLULITIS OF RIGHT LOWER EXTREMITY: Primary | ICD-10-CM

## 2018-07-27 RX ORDER — SULFAMETHOXAZOLE AND TRIMETHOPRIM 800; 160 MG/1; MG/1
1 TABLET ORAL 2 TIMES DAILY
Qty: 14 TABLET | Refills: 0 | Status: SHIPPED | OUTPATIENT
Start: 2018-07-27 | End: 2018-08-16 | Stop reason: ALTCHOICE

## 2018-08-09 ENCOUNTER — TELEPHONE (OUTPATIENT)
Dept: INTERNAL MEDICINE CLINIC | Facility: CLINIC | Age: 58
End: 2018-08-09

## 2018-08-09 DIAGNOSIS — L03.115 CELLULITIS OF RIGHT LOWER EXTREMITY: ICD-10-CM

## 2018-08-09 DIAGNOSIS — R51.9 NONINTRACTABLE HEADACHE, UNSPECIFIED CHRONICITY PATTERN, UNSPECIFIED HEADACHE TYPE: ICD-10-CM

## 2018-08-09 RX ORDER — TRAZODONE HYDROCHLORIDE 100 MG/1
TABLET ORAL
Qty: 30 TABLET | Refills: 2 | Status: SHIPPED | OUTPATIENT
Start: 2018-08-09 | End: 2018-11-09

## 2018-08-09 RX ORDER — SULFAMETHOXAZOLE AND TRIMETHOPRIM 800; 160 MG/1; MG/1
TABLET ORAL
Qty: 14 TABLET | Refills: 0 | OUTPATIENT
Start: 2018-08-09

## 2018-08-09 RX ORDER — TOPIRAMATE 50 MG/1
TABLET, FILM COATED ORAL
Qty: 30 TABLET | Refills: 2 | Status: SHIPPED | OUTPATIENT
Start: 2018-08-09 | End: 2018-11-09

## 2018-08-09 NOTE — TELEPHONE ENCOUNTER
requesting a call back from Dr Tucker Honeycutt, says he can not stand on his right leg. Did not leave any other detail just requesting a call back.

## 2018-08-10 ENCOUNTER — TELEPHONE (OUTPATIENT)
Dept: INTERNAL MEDICINE CLINIC | Facility: CLINIC | Age: 58
End: 2018-08-10

## 2018-08-16 ENCOUNTER — OFFICE VISIT (OUTPATIENT)
Dept: INTERNAL MEDICINE CLINIC | Facility: CLINIC | Age: 58
End: 2018-08-16
Payer: MEDICAID

## 2018-08-16 VITALS
BODY MASS INDEX: 32.88 KG/M2 | DIASTOLIC BLOOD PRESSURE: 80 MMHG | HEIGHT: 69 IN | WEIGHT: 222 LBS | OXYGEN SATURATION: 96 % | SYSTOLIC BLOOD PRESSURE: 130 MMHG | HEART RATE: 86 BPM

## 2018-08-16 DIAGNOSIS — M51.16 LUMBAR DISC DISEASE WITH RADICULOPATHY: ICD-10-CM

## 2018-08-16 DIAGNOSIS — M50.20 CERVICAL HERNIATED DISC: ICD-10-CM

## 2018-08-16 DIAGNOSIS — J44.9 ASTHMA WITH COPD (CHRONIC OBSTRUCTIVE PULMONARY DISEASE) (HCC): ICD-10-CM

## 2018-08-16 DIAGNOSIS — L02.416 ABSCESS OF LEFT LEG: Primary | ICD-10-CM

## 2018-08-16 DIAGNOSIS — I10 HTN, GOAL BELOW 140/90: ICD-10-CM

## 2018-08-16 DIAGNOSIS — M79.7 FIBROMYALGIA AFFECTING MULTIPLE SITES: ICD-10-CM

## 2018-08-16 DIAGNOSIS — M50.90 CERVICAL DISC DISEASE: ICD-10-CM

## 2018-08-16 DIAGNOSIS — M50.90 CERVICAL BACK PAIN WITH EVIDENCE OF DISC DISEASE: ICD-10-CM

## 2018-08-16 DIAGNOSIS — K21.00 GERD WITH ESOPHAGITIS: ICD-10-CM

## 2018-08-16 PROCEDURE — 10060 I&D ABSCESS SIMPLE/SINGLE: CPT | Performed by: FAMILY MEDICINE

## 2018-08-16 PROCEDURE — 99214 OFFICE O/P EST MOD 30 MIN: CPT | Performed by: FAMILY MEDICINE

## 2018-08-16 RX ORDER — SULFAMETHOXAZOLE AND TRIMETHOPRIM 800; 160 MG/1; MG/1
1 TABLET ORAL 2 TIMES DAILY
Qty: 20 TABLET | Refills: 0 | Status: ON HOLD | OUTPATIENT
Start: 2018-08-16 | End: 2018-10-01

## 2018-08-16 RX ORDER — PREGABALIN 150 MG/1
150 CAPSULE ORAL 2 TIMES DAILY
Qty: 60 CAPSULE | Refills: 2 | Status: SHIPPED | OUTPATIENT
Start: 2018-08-16 | End: 2018-09-20

## 2018-08-16 RX ORDER — KETOROLAC TROMETHAMINE 30 MG/ML
60 INJECTION, SOLUTION INTRAMUSCULAR; INTRAVENOUS ONCE
Status: COMPLETED | OUTPATIENT
Start: 2018-08-16 | End: 2018-08-16

## 2018-08-16 RX ADMIN — KETOROLAC TROMETHAMINE 60 MG: 30 INJECTION, SOLUTION INTRAMUSCULAR; INTRAVENOUS at 15:45:00

## 2018-08-16 NOTE — TELEPHONE ENCOUNTER
Patient scheduled appointment.  Dr Lloyd Knight wants to see the patient before any additional medications negative...

## 2018-08-17 ENCOUNTER — TELEPHONE (OUTPATIENT)
Dept: INTERNAL MEDICINE CLINIC | Facility: CLINIC | Age: 58
End: 2018-08-17

## 2018-08-17 NOTE — TELEPHONE ENCOUNTER
Patient calling says he has been trying to get a hold of his Dr for the pain medication but he can not get through , says he's in a lot of pain and wanted to see if Dr Edda Dickson can prescribe him something for the cut on his leg from yesterdays appt.  He has be

## 2018-08-18 NOTE — PROGRESS NOTES
CC:  Follow - Up and Test Results      Hx of CC:  S/P BACTRIM COURSE FOR LEFT LEG/LATERAL KNEE SKIN INFECTION--REDNESS LARGELY RESOLVED BUT CENTRAL SCAB AND DRAINAGE NOT FULLY HEALED YET.   NECK SYMPTOMS AND PERIODIC GAGGING PRESENT--WILL POSSIBLY GET SPINA into the muscle once. 3. Lumbar disc disease with radiculopathy    GOT NORCO FROM ORTHO (DR. Browning Body)  - pregabalin (LYRICA) 150 MG Oral Cap; Take 1 capsule (150 mg total) by mouth 2 (two) times daily. Dispense: 60 capsule;  Refill: 2  - Ketorolac Tromet

## 2018-08-18 NOTE — TELEPHONE ENCOUNTER
LMOM:  Patient got his norco from orthopedics (Dr. Eula Barksdale) & only written prescriptions allowed--cannot be called in. Already has meloxicam and lyrica.

## 2018-08-20 ENCOUNTER — TELEPHONE (OUTPATIENT)
Dept: INTERNAL MEDICINE CLINIC | Facility: CLINIC | Age: 58
End: 2018-08-20

## 2018-08-21 ENCOUNTER — OFFICE VISIT (OUTPATIENT)
Dept: INTERNAL MEDICINE CLINIC | Facility: CLINIC | Age: 58
End: 2018-08-21
Payer: MEDICAID

## 2018-08-21 VITALS
SYSTOLIC BLOOD PRESSURE: 130 MMHG | WEIGHT: 222 LBS | OXYGEN SATURATION: 94 % | DIASTOLIC BLOOD PRESSURE: 80 MMHG | HEART RATE: 84 BPM | HEIGHT: 69 IN | BODY MASS INDEX: 32.88 KG/M2

## 2018-08-21 DIAGNOSIS — Z09 EXAMINATION, FOLLOW UP: ICD-10-CM

## 2018-08-21 DIAGNOSIS — Q76.1 CERVICAL FUSION SYNDROME: Primary | ICD-10-CM

## 2018-08-21 DIAGNOSIS — M51.16 LUMBAR DISC DISEASE WITH RADICULOPATHY: ICD-10-CM

## 2018-08-21 PROCEDURE — 96372 THER/PROPH/DIAG INJ SC/IM: CPT | Performed by: FAMILY MEDICINE

## 2018-08-21 PROCEDURE — 99213 OFFICE O/P EST LOW 20 MIN: CPT | Performed by: FAMILY MEDICINE

## 2018-08-21 RX ORDER — HYDROCODONE BITARTRATE AND ACETAMINOPHEN 10; 325 MG/1; MG/1
1 TABLET ORAL EVERY 4 HOURS PRN
Qty: 180 TABLET | Refills: 0 | Status: SHIPPED | OUTPATIENT
Start: 2018-08-21 | End: 2018-09-27

## 2018-08-21 RX ORDER — KETOROLAC TROMETHAMINE 30 MG/ML
60 INJECTION, SOLUTION INTRAMUSCULAR; INTRAVENOUS ONCE
Status: COMPLETED | OUTPATIENT
Start: 2018-08-21 | End: 2018-08-21

## 2018-08-21 RX ADMIN — KETOROLAC TROMETHAMINE 60 MG: 30 INJECTION, SOLUTION INTRAMUSCULAR; INTRAVENOUS at 11:00:00

## 2018-08-22 NOTE — PROGRESS NOTES
CC:  Leg Pain (c/o pain on left leg)      Hx of CC:  Dr. Aquilino Rodriguez put wrong  on written prescription for [place] & patient cannot fill-->brought in original paper rx (I confirmed it). Chronic neck and lumbar DJD/sciatica.     Healing left knee infectio types were placed in this encounter.

## 2018-09-01 ENCOUNTER — OFFICE VISIT (OUTPATIENT)
Dept: INTERNAL MEDICINE CLINIC | Facility: CLINIC | Age: 58
End: 2018-09-01
Payer: MEDICAID

## 2018-09-01 VITALS
SYSTOLIC BLOOD PRESSURE: 130 MMHG | OXYGEN SATURATION: 96 % | HEIGHT: 69 IN | HEART RATE: 99 BPM | BODY MASS INDEX: 32.58 KG/M2 | DIASTOLIC BLOOD PRESSURE: 80 MMHG | WEIGHT: 220 LBS

## 2018-09-01 DIAGNOSIS — T14.8XXA WOUND, OPEN: Primary | ICD-10-CM

## 2018-09-01 PROCEDURE — 99212 OFFICE O/P EST SF 10 MIN: CPT | Performed by: FAMILY MEDICINE

## 2018-09-01 NOTE — PROGRESS NOTES
CC:  Follow - Up (left knee)      Hx of CC:  LEFT KNEE WOUND SCAB FELL OFF--STILL HAS NOT CLOSED/HEALED. Vitals:    09/01/18  1140   BP: 130/80   Pulse: 99   SpO2: 96%   Weight: 220 lb   Height: 69\"         Body mass index is 32.49 kg/m².     ROS:  Gene

## 2018-09-19 ENCOUNTER — TELEPHONE (OUTPATIENT)
Dept: INTERNAL MEDICINE CLINIC | Facility: CLINIC | Age: 58
End: 2018-09-19

## 2018-09-20 DIAGNOSIS — M50.90 CERVICAL BACK PAIN WITH EVIDENCE OF DISC DISEASE: ICD-10-CM

## 2018-09-20 DIAGNOSIS — M79.7 FIBROMYALGIA AFFECTING MULTIPLE SITES: ICD-10-CM

## 2018-09-20 DIAGNOSIS — M51.16 LUMBAR DISC DISEASE WITH RADICULOPATHY: ICD-10-CM

## 2018-09-20 DIAGNOSIS — L03.116 CELLULITIS OF LEFT KNEE: Primary | ICD-10-CM

## 2018-09-20 RX ORDER — DOXYCYCLINE 100 MG/1
100 TABLET ORAL 2 TIMES DAILY
Qty: 20 TABLET | Refills: 0 | Status: ON HOLD | OUTPATIENT
Start: 2018-09-20 | End: 2018-10-01

## 2018-09-20 RX ORDER — PREGABALIN 150 MG/1
150 CAPSULE ORAL 2 TIMES DAILY
Qty: 60 CAPSULE | Refills: 2 | Status: SHIPPED | OUTPATIENT
Start: 2018-09-20 | End: 2019-02-20

## 2018-09-20 RX ORDER — FLUTICASONE FUROATE 200 UG/1
POWDER RESPIRATORY (INHALATION)
Refills: 3 | COMMUNITY
Start: 2018-09-17 | End: 2019-03-21

## 2018-09-27 ENCOUNTER — OFFICE VISIT (OUTPATIENT)
Dept: INTERNAL MEDICINE CLINIC | Facility: CLINIC | Age: 58
End: 2018-09-27
Payer: MEDICAID

## 2018-09-27 ENCOUNTER — HOSPITAL ENCOUNTER (INPATIENT)
Facility: HOSPITAL | Age: 58
LOS: 4 days | Discharge: HOME | DRG: 488 | End: 2018-10-01
Attending: HOSPITALIST | Admitting: EMERGENCY MEDICINE
Payer: MEDICAID

## 2018-09-27 ENCOUNTER — APPOINTMENT (OUTPATIENT)
Dept: GENERAL RADIOLOGY | Facility: HOSPITAL | Age: 58
DRG: 488 | End: 2018-09-27
Attending: NURSE PRACTITIONER
Payer: MEDICAID

## 2018-09-27 VITALS
OXYGEN SATURATION: 99 % | WEIGHT: 220 LBS | BODY MASS INDEX: 32.58 KG/M2 | SYSTOLIC BLOOD PRESSURE: 166 MMHG | HEART RATE: 68 BPM | RESPIRATION RATE: 18 BRPM | HEIGHT: 69 IN | DIASTOLIC BLOOD PRESSURE: 98 MMHG

## 2018-09-27 DIAGNOSIS — Z23 NEED FOR INFLUENZA VACCINATION: Primary | ICD-10-CM

## 2018-09-27 DIAGNOSIS — M00.9 PYOGENIC ARTHRITIS OF LEFT KNEE JOINT, DUE TO UNSPECIFIED ORGANISM (HCC): ICD-10-CM

## 2018-09-27 DIAGNOSIS — M70.42 PREPATELLAR BURSITIS, LEFT KNEE: ICD-10-CM

## 2018-09-27 DIAGNOSIS — Z22.322 MRSA CARRIER: ICD-10-CM

## 2018-09-27 DIAGNOSIS — L03.116 CELLULITIS OF LEFT KNEE: Primary | ICD-10-CM

## 2018-09-27 DIAGNOSIS — Q76.1 CERVICAL FUSION SYNDROME: ICD-10-CM

## 2018-09-27 DIAGNOSIS — L03.116 CELLULITIS OF LEFT LEG: ICD-10-CM

## 2018-09-27 DIAGNOSIS — M51.16 LUMBAR DISC DISEASE WITH RADICULOPATHY: ICD-10-CM

## 2018-09-27 PROCEDURE — 99222 1ST HOSP IP/OBS MODERATE 55: CPT | Performed by: HOSPITALIST

## 2018-09-27 PROCEDURE — 99213 OFFICE O/P EST LOW 20 MIN: CPT | Performed by: FAMILY MEDICINE

## 2018-09-27 PROCEDURE — 90471 IMMUNIZATION ADMIN: CPT | Performed by: FAMILY MEDICINE

## 2018-09-27 PROCEDURE — 73560 X-RAY EXAM OF KNEE 1 OR 2: CPT | Performed by: NURSE PRACTITIONER

## 2018-09-27 PROCEDURE — 90686 IIV4 VACC NO PRSV 0.5 ML IM: CPT | Performed by: FAMILY MEDICINE

## 2018-09-27 RX ORDER — HYDROCODONE BITARTRATE AND ACETAMINOPHEN 5; 325 MG/1; MG/1
2 TABLET ORAL ONCE
Status: COMPLETED | OUTPATIENT
Start: 2018-09-27 | End: 2018-09-27

## 2018-09-27 RX ORDER — ALBUTEROL SULFATE 2.5 MG/3ML
2.5 SOLUTION RESPIRATORY (INHALATION) EVERY 4 HOURS PRN
Status: DISCONTINUED | OUTPATIENT
Start: 2018-09-27 | End: 2018-10-01

## 2018-09-27 RX ORDER — ACETAMINOPHEN 325 MG/1
650 TABLET ORAL EVERY 4 HOURS PRN
Status: DISCONTINUED | OUTPATIENT
Start: 2018-09-27 | End: 2018-10-01

## 2018-09-27 RX ORDER — SODIUM CHLORIDE 9 MG/ML
INJECTION, SOLUTION INTRAVENOUS CONTINUOUS
Status: DISCONTINUED | OUTPATIENT
Start: 2018-09-27 | End: 2018-09-27

## 2018-09-27 RX ORDER — SODIUM CHLORIDE 9 MG/ML
INJECTION, SOLUTION INTRAVENOUS CONTINUOUS
Status: DISCONTINUED | OUTPATIENT
Start: 2018-09-27 | End: 2018-10-01

## 2018-09-27 RX ORDER — CYCLOBENZAPRINE HCL 10 MG
10 TABLET ORAL 3 TIMES DAILY PRN
Status: DISCONTINUED | OUTPATIENT
Start: 2018-09-27 | End: 2018-10-01

## 2018-09-27 RX ORDER — ACETAMINOPHEN 325 MG/1
650 TABLET ORAL EVERY 6 HOURS PRN
Status: DISCONTINUED | OUTPATIENT
Start: 2018-09-27 | End: 2018-09-27

## 2018-09-27 RX ORDER — KETOROLAC TROMETHAMINE 30 MG/ML
30 INJECTION, SOLUTION INTRAMUSCULAR; INTRAVENOUS ONCE
Status: COMPLETED | OUTPATIENT
Start: 2018-09-27 | End: 2018-09-27

## 2018-09-27 RX ORDER — HYDROCODONE BITARTRATE AND ACETAMINOPHEN 5; 325 MG/1; MG/1
1 TABLET ORAL EVERY 4 HOURS PRN
Status: DISCONTINUED | OUTPATIENT
Start: 2018-09-27 | End: 2018-10-01

## 2018-09-27 RX ORDER — FUROSEMIDE 20 MG/1
20 TABLET ORAL 2 TIMES DAILY
Status: DISCONTINUED | OUTPATIENT
Start: 2018-09-28 | End: 2018-09-29

## 2018-09-27 RX ORDER — HYDROCODONE BITARTRATE AND ACETAMINOPHEN 5; 325 MG/1; MG/1
2 TABLET ORAL EVERY 4 HOURS PRN
Status: DISCONTINUED | OUTPATIENT
Start: 2018-09-27 | End: 2018-10-01

## 2018-09-27 RX ORDER — SODIUM CHLORIDE 0.9 % (FLUSH) 0.9 %
3 SYRINGE (ML) INJECTION AS NEEDED
Status: DISCONTINUED | OUTPATIENT
Start: 2018-09-27 | End: 2018-10-01

## 2018-09-27 RX ORDER — FLUTICASONE PROPIONATE AND SALMETEROL 232; 14 UG/1; UG/1
1 POWDER, METERED RESPIRATORY (INHALATION) 2 TIMES DAILY
Status: DISCONTINUED | OUTPATIENT
Start: 2018-09-28 | End: 2018-09-27

## 2018-09-27 RX ORDER — LISINOPRIL 10 MG/1
10 TABLET ORAL 2 TIMES DAILY
Status: DISCONTINUED | OUTPATIENT
Start: 2018-09-28 | End: 2018-10-01

## 2018-09-27 RX ORDER — MONTELUKAST SODIUM 10 MG/1
10 TABLET ORAL DAILY
Status: DISCONTINUED | OUTPATIENT
Start: 2018-09-28 | End: 2018-10-01

## 2018-09-27 RX ORDER — DOXYCYCLINE 100 MG/1
100 TABLET ORAL 2 TIMES DAILY
Status: DISCONTINUED | OUTPATIENT
Start: 2018-09-27 | End: 2018-09-27

## 2018-09-27 RX ORDER — PREGABALIN 50 MG/1
150 CAPSULE ORAL 2 TIMES DAILY
Status: DISCONTINUED | OUTPATIENT
Start: 2018-09-27 | End: 2018-10-01

## 2018-09-27 RX ORDER — FAMOTIDINE 20 MG/1
40 TABLET ORAL DAILY
Status: DISCONTINUED | OUTPATIENT
Start: 2018-09-28 | End: 2018-10-01

## 2018-09-27 RX ORDER — TRAZODONE HYDROCHLORIDE 100 MG/1
100 TABLET ORAL NIGHTLY PRN
Status: DISCONTINUED | OUTPATIENT
Start: 2018-09-27 | End: 2018-10-01

## 2018-09-27 RX ORDER — CLINDAMYCIN PHOSPHATE 600 MG/50ML
600 INJECTION INTRAVENOUS EVERY 8 HOURS
Status: DISCONTINUED | OUTPATIENT
Start: 2018-09-28 | End: 2018-10-01

## 2018-09-27 RX ORDER — FLUTICASONE PROPIONATE 50 MCG
1 SPRAY, SUSPENSION (ML) NASAL DAILY
Status: DISCONTINUED | OUTPATIENT
Start: 2018-09-28 | End: 2018-10-01

## 2018-09-27 RX ORDER — CLINDAMYCIN PHOSPHATE 600 MG/50ML
600 INJECTION INTRAVENOUS ONCE
Status: COMPLETED | OUTPATIENT
Start: 2018-09-27 | End: 2018-09-27

## 2018-09-27 RX ORDER — TOPIRAMATE 25 MG/1
50 TABLET ORAL DAILY
Status: DISCONTINUED | OUTPATIENT
Start: 2018-09-27 | End: 2018-10-01

## 2018-09-27 RX ORDER — ONDANSETRON 2 MG/ML
4 INJECTION INTRAMUSCULAR; INTRAVENOUS EVERY 6 HOURS PRN
Status: DISCONTINUED | OUTPATIENT
Start: 2018-09-27 | End: 2018-09-30

## 2018-09-27 RX ORDER — ALBUTEROL SULFATE 90 UG/1
1 AEROSOL, METERED RESPIRATORY (INHALATION) EVERY 4 HOURS PRN
Status: DISCONTINUED | OUTPATIENT
Start: 2018-09-27 | End: 2018-10-01

## 2018-09-27 RX ORDER — HYDROCODONE BITARTRATE AND ACETAMINOPHEN 10; 325 MG/1; MG/1
1 TABLET ORAL EVERY 4 HOURS PRN
Qty: 180 TABLET | Refills: 0 | Status: SHIPPED | OUTPATIENT
Start: 2018-09-27 | End: 2018-10-18

## 2018-09-27 RX ORDER — ATORVASTATIN CALCIUM 40 MG/1
40 TABLET, FILM COATED ORAL NIGHTLY
Status: DISCONTINUED | OUTPATIENT
Start: 2018-09-27 | End: 2018-10-01

## 2018-09-27 NOTE — ED INITIAL ASSESSMENT (HPI)
Pt here per dr Soraya Ramos with septic left knee, pt has been on bactrim x2 and doxycycline.  Increasing pain to left knee

## 2018-09-27 NOTE — ED PROVIDER NOTES
Patient Seen in: [facility] AND [facility] Emergency Department    History   Patient presents with:  Pain (neurologic): sent from dr Monik Rodgers office for septic left knee    Stated Complaint: septic joint, left knee     Patient presents into the emergency department use: No      Review of Systems   Musculoskeletal:        Left knee redness and warmth   All other systems reviewed and are negative. Positive for stated complaint: septic joint, left knee   Other systems are as noted in HPI.   Constitutional and vital following components:       Result Value    Urobilinogen Urine 2.0 (*)     All other components within normal limits   CBC W/ DIFFERENTIAL - Abnormal; Notable for the following components:    WBC 13.7 (*)     Neutrophil Absolute 9.0 (*)     Monocyte Absolu Negative mg/dL    Bilirubin Urine Negative Negative    Blood Urine Negative Negative    Nitrite Urine Negative Negative    Urobilinogen Urine 2.0 (A) <2.0    Leukocyte Esterase Urine Negative Negative    Ascorbic Acid Urine Negative Negative mg/dL    Micro

## 2018-09-28 ENCOUNTER — ANESTHESIA EVENT (OUTPATIENT)
Dept: SURGERY | Facility: HOSPITAL | Age: 58
DRG: 488 | End: 2018-09-28
Payer: MEDICAID

## 2018-09-28 ENCOUNTER — ANESTHESIA (OUTPATIENT)
Dept: SURGERY | Facility: HOSPITAL | Age: 58
DRG: 488 | End: 2018-09-28
Payer: MEDICAID

## 2018-09-28 PROCEDURE — 3E1U38Z IRRIGATION OF JOINTS USING IRRIGATING SUBSTANCE, PERCUTANEOUS APPROACH: ICD-10-PCS | Performed by: ORTHOPAEDIC SURGERY

## 2018-09-28 PROCEDURE — 99232 SBSQ HOSP IP/OBS MODERATE 35: CPT | Performed by: HOSPITALIST

## 2018-09-28 PROCEDURE — 0S9D00Z DRAINAGE OF LEFT KNEE JOINT WITH DRAINAGE DEVICE, OPEN APPROACH: ICD-10-PCS | Performed by: ORTHOPAEDIC SURGERY

## 2018-09-28 RX ORDER — POLYETHYLENE GLYCOL 3350 17 G/17G
17 POWDER, FOR SOLUTION ORAL DAILY PRN
Status: DISCONTINUED | OUTPATIENT
Start: 2018-09-28 | End: 2018-10-01

## 2018-09-28 RX ORDER — LIDOCAINE HYDROCHLORIDE 10 MG/ML
INJECTION, SOLUTION EPIDURAL; INFILTRATION; INTRACAUDAL; PERINEURAL AS NEEDED
Status: DISCONTINUED | OUTPATIENT
Start: 2018-09-28 | End: 2018-09-28 | Stop reason: SURG

## 2018-09-28 RX ORDER — SODIUM CHLORIDE, SODIUM LACTATE, POTASSIUM CHLORIDE, CALCIUM CHLORIDE 600; 310; 30; 20 MG/100ML; MG/100ML; MG/100ML; MG/100ML
INJECTION, SOLUTION INTRAVENOUS CONTINUOUS PRN
Status: DISCONTINUED | OUTPATIENT
Start: 2018-09-28 | End: 2018-09-28

## 2018-09-28 RX ORDER — DOCUSATE SODIUM 100 MG/1
100 CAPSULE, LIQUID FILLED ORAL 2 TIMES DAILY
Status: DISCONTINUED | OUTPATIENT
Start: 2018-09-28 | End: 2018-10-01

## 2018-09-28 RX ORDER — MORPHINE SULFATE 2 MG/ML
1 INJECTION, SOLUTION INTRAMUSCULAR; INTRAVENOUS EVERY 2 HOUR PRN
Status: DISCONTINUED | OUTPATIENT
Start: 2018-09-28 | End: 2018-10-01

## 2018-09-28 RX ORDER — ASPIRIN 325 MG
325 TABLET ORAL 2 TIMES DAILY
Status: DISCONTINUED | OUTPATIENT
Start: 2018-09-28 | End: 2018-10-01

## 2018-09-28 RX ORDER — MORPHINE SULFATE 2 MG/ML
2 INJECTION, SOLUTION INTRAMUSCULAR; INTRAVENOUS EVERY 4 HOURS PRN
Status: DISCONTINUED | OUTPATIENT
Start: 2018-09-28 | End: 2018-10-01

## 2018-09-28 RX ORDER — MORPHINE SULFATE 4 MG/ML
4 INJECTION, SOLUTION INTRAMUSCULAR; INTRAVENOUS EVERY 2 HOUR PRN
Status: DISCONTINUED | OUTPATIENT
Start: 2018-09-28 | End: 2018-10-01

## 2018-09-28 RX ORDER — HALOPERIDOL 5 MG/ML
0.25 INJECTION INTRAMUSCULAR ONCE AS NEEDED
Status: DISCONTINUED | OUTPATIENT
Start: 2018-09-28 | End: 2018-09-28 | Stop reason: HOSPADM

## 2018-09-28 RX ORDER — HYDROCODONE BITARTRATE AND ACETAMINOPHEN 5; 325 MG/1; MG/1
1 TABLET ORAL AS NEEDED
Status: DISCONTINUED | OUTPATIENT
Start: 2018-09-28 | End: 2018-09-28 | Stop reason: HOSPADM

## 2018-09-28 RX ORDER — MIDAZOLAM HYDROCHLORIDE 1 MG/ML
INJECTION INTRAMUSCULAR; INTRAVENOUS AS NEEDED
Status: DISCONTINUED | OUTPATIENT
Start: 2018-09-28 | End: 2018-09-28 | Stop reason: SURG

## 2018-09-28 RX ORDER — MORPHINE SULFATE 4 MG/ML
4 INJECTION, SOLUTION INTRAMUSCULAR; INTRAVENOUS EVERY 10 MIN PRN
Status: DISCONTINUED | OUTPATIENT
Start: 2018-09-28 | End: 2018-09-28 | Stop reason: HOSPADM

## 2018-09-28 RX ORDER — HYDROCODONE BITARTRATE AND ACETAMINOPHEN 5; 325 MG/1; MG/1
1 TABLET ORAL EVERY 4 HOURS PRN
Status: DISCONTINUED | OUTPATIENT
Start: 2018-09-28 | End: 2018-10-01

## 2018-09-28 RX ORDER — BISACODYL 10 MG
10 SUPPOSITORY, RECTAL RECTAL
Status: DISCONTINUED | OUTPATIENT
Start: 2018-09-28 | End: 2018-10-01

## 2018-09-28 RX ORDER — ONDANSETRON 2 MG/ML
4 INJECTION INTRAMUSCULAR; INTRAVENOUS ONCE AS NEEDED
Status: DISCONTINUED | OUTPATIENT
Start: 2018-09-28 | End: 2018-09-28 | Stop reason: HOSPADM

## 2018-09-28 RX ORDER — DEXAMETHASONE SODIUM PHOSPHATE 4 MG/ML
VIAL (ML) INJECTION AS NEEDED
Status: DISCONTINUED | OUTPATIENT
Start: 2018-09-28 | End: 2018-09-28 | Stop reason: SURG

## 2018-09-28 RX ORDER — MORPHINE SULFATE 2 MG/ML
2 INJECTION, SOLUTION INTRAMUSCULAR; INTRAVENOUS EVERY 2 HOUR PRN
Status: DISCONTINUED | OUTPATIENT
Start: 2018-09-28 | End: 2018-10-01

## 2018-09-28 RX ORDER — MORPHINE SULFATE 10 MG/ML
6 INJECTION, SOLUTION INTRAMUSCULAR; INTRAVENOUS EVERY 10 MIN PRN
Status: DISCONTINUED | OUTPATIENT
Start: 2018-09-28 | End: 2018-09-28 | Stop reason: HOSPADM

## 2018-09-28 RX ORDER — SODIUM CHLORIDE 0.9 % (FLUSH) 0.9 %
10 SYRINGE (ML) INJECTION AS NEEDED
Status: DISCONTINUED | OUTPATIENT
Start: 2018-09-28 | End: 2018-10-01

## 2018-09-28 RX ORDER — HYDROCODONE BITARTRATE AND ACETAMINOPHEN 5; 325 MG/1; MG/1
2 TABLET ORAL AS NEEDED
Status: DISCONTINUED | OUTPATIENT
Start: 2018-09-28 | End: 2018-09-28 | Stop reason: HOSPADM

## 2018-09-28 RX ORDER — ONDANSETRON 2 MG/ML
4 INJECTION INTRAMUSCULAR; INTRAVENOUS EVERY 6 HOURS PRN
Status: DISCONTINUED | OUTPATIENT
Start: 2018-09-28 | End: 2018-10-01

## 2018-09-28 RX ORDER — SODIUM CHLORIDE, SODIUM LACTATE, POTASSIUM CHLORIDE, CALCIUM CHLORIDE 600; 310; 30; 20 MG/100ML; MG/100ML; MG/100ML; MG/100ML
INJECTION, SOLUTION INTRAVENOUS CONTINUOUS
Status: DISCONTINUED | OUTPATIENT
Start: 2018-09-28 | End: 2018-09-28 | Stop reason: HOSPADM

## 2018-09-28 RX ORDER — MORPHINE SULFATE 4 MG/ML
2 INJECTION, SOLUTION INTRAMUSCULAR; INTRAVENOUS EVERY 10 MIN PRN
Status: DISCONTINUED | OUTPATIENT
Start: 2018-09-28 | End: 2018-09-28 | Stop reason: HOSPADM

## 2018-09-28 RX ORDER — NALOXONE HYDROCHLORIDE 0.4 MG/ML
80 INJECTION, SOLUTION INTRAMUSCULAR; INTRAVENOUS; SUBCUTANEOUS AS NEEDED
Status: DISCONTINUED | OUTPATIENT
Start: 2018-09-28 | End: 2018-09-28 | Stop reason: HOSPADM

## 2018-09-28 RX ORDER — SODIUM PHOSPHATE, DIBASIC AND SODIUM PHOSPHATE, MONOBASIC 7; 19 G/133ML; G/133ML
1 ENEMA RECTAL ONCE AS NEEDED
Status: DISCONTINUED | OUTPATIENT
Start: 2018-09-28 | End: 2018-10-01

## 2018-09-28 RX ADMIN — MIDAZOLAM HYDROCHLORIDE 2 MG: 1 INJECTION INTRAMUSCULAR; INTRAVENOUS at 14:06:00

## 2018-09-28 RX ADMIN — DEXAMETHASONE SODIUM PHOSPHATE 4 MG: 4 MG/ML VIAL (ML) INJECTION at 14:18:00

## 2018-09-28 RX ADMIN — ONDANSETRON 4 MG: 2 INJECTION INTRAMUSCULAR; INTRAVENOUS at 14:18:00

## 2018-09-28 RX ADMIN — LIDOCAINE HYDROCHLORIDE 50 MG: 10 INJECTION, SOLUTION EPIDURAL; INFILTRATION; INTRACAUDAL; PERINEURAL at 14:11:00

## 2018-09-28 RX ADMIN — SODIUM CHLORIDE: 9 INJECTION, SOLUTION INTRAVENOUS at 14:07:00

## 2018-09-28 RX ADMIN — SODIUM CHLORIDE: 9 INJECTION, SOLUTION INTRAVENOUS at 14:45:00

## 2018-09-28 NOTE — ANESTHESIA PREPROCEDURE EVALUATION
Anesthesia PreOp Note    HPI:     Kimberly Anders is a 63year old male who presents for preoperative consultation requested by: Miah Ruby MD    Date of Surgery: 9/27/2018 - 9/28/2018    Procedure(s):  EXTREMITY LOWER IRRIGATION & DEBRIDEMENT  Indicatio SURGERY  1972: TONSILLECTOMY      Medications Prior to Admission:  HYDROcodone-acetaminophen (NORCO)  MG Oral Tab Take 1 tablet by mouth every 4 (four) hours as needed for Pain.  Disp: 180 tablet Rfl: 0 9/27/2018 at Unknown time   ARNUITY ELLIPTA 200 tablet (300 mg total) by mouth nightly.  For gastritis/acid reflux Disp: 30 tablet Rfl: 5 9/27/2018 at Unknown time   Fluticasone-Salmeterol (AIRDUO RESPICLICK 804/61) 849-96 MCG/ACT Inhalation Aerosol Powder, Breath Activated Inhale 1 puff into the lungs 2 tab 650 mg 650 mg Oral Q4H PRN Ebenezer Lucas MD     Or         Kenny Stafford Hold] HYDROcodone-acetaminophen (NORCO) 5-325 MG per tab 1 tablet 1 tablet Oral Q4H PRN Ebenezer Lucas MD     Or         Kenny Stafford Hold] HYDROcodone-acetaminophen (NORCO) 5-325 MG per tab Shira Rasheed MD 1 Application at 94/11/22 0907    [MAR Hold] Fluticasone Furoate-Vilanterol (BREO ELLIPTA) 200-25 MCG/INH inhaler 1 puff 1 puff Inhalation Daily Shira Rasheed MD 1 puff at 09/28/18 0906    triamcinolone acetonide (KENALOG-40) 40 MG/M Asked        Self-Exams: Not Asked    Social History Narrative      The patient does not use an assistive device. .        The patient does live in a home with stairs. Available pre-op labs reviewed.   Lab Results   Component Value Date    WBC 9.6 09/28 Anesthesia Plan:   ASA:  2  Plan:   General  Airway:  LMA  Informed Consent Plan and Risks Discussed With:  Patient  Discussed plan with:  CRNA, attending and surgeon  Provider Attestation (if preop done by other):  GA/LMA,poss ETT/video,PONV,dental [place]

## 2018-09-28 NOTE — PAYOR COMM NOTE
--------------  ADMISSION REVIEW     Payor: Murray Salmeron #:  [id number]  Authorization Number: [id number]    Admit date: 9/27/18  Admit time: 2020         Patient Seen in: [facility] Emergency Department    H other systems reviewed and are negative.     Positive for stated complaint: septic joint, left knee     ED Triage Vitals [09/27/18 1806]   /89   Pulse 101   Resp 18   Temp 98.3 °F (36.8 °C)   Temp src Temporal   SpO2 94 %   O2 Device None (Room air) and will require admission for left knee cellulitis/ prepatellar bursitis. he was agreeable to plan of care. I did call and speak with Dr. Kevin Long. Notified him of the patient's chief complaint clinical exam.  Patient will be admitted to his service. Patient is a 23-year-old  male who had a pimple at the lateral aspect of his left knee which he scratched around 2 weeks ago. After that, he started having spreading cellulitis of the anterior aspect of his left knee.   He was on three courses of clubbing, or cyanosis. There is erythema extending from the lateral to the medial aspect of the left knee covering the patellar area with tenderness to palpation. No drainage. There is a thin scab noted at the lateral aspect of the left knee.   There is

## 2018-09-28 NOTE — OPERATIVE REPORT
Operative Note    Patient Name: Cole Neumann    Preoperative Diagnosis: Prepatellar bursitis, left knee [M70.42]    Postoperative Diagnosis: Prepatellar bursitis, left knee [M70.42]    Primary Surgeon: Vicky Gauthier MD     Assistant: Shahab Dey, [facility]

## 2018-09-28 NOTE — H&P
[facility]    PATIENT'S NAME: Soo Burnett   ATTENDING PHYSICIAN: Danny Alegre MD   PATIENT ACCOUNT#:   [id number]    LOCATION:  [address] [place] [address] RECORD #:   [id number]       YOB: 1960  ADMISSION DATE:       09/27/2018 place, and person. No acute distress. VITAL SIGNS:  Temperature 98.3, pulse 101, respiratory rate 18, blood pressure 148/89, pulse ox 94% on room air. HEENT:  Atraumatic. Oropharynx clear. Moist mucous membranes. Normal hard and soft palate.    NECK:

## 2018-09-28 NOTE — PROGRESS NOTES
[facility]    Progress Note    Jennifer Gibbons Patient Status:  Inpatient    7/10/1960 MRN [id number]   Location [facility] 4W/SW/SE Attending Siomara Duran MD   Hosp Day # 1 PCP Sana Zepeda DO       Subjective:   Jennifer Gibbons is a(n mg, Oral, Q4H PRN **OR** HYDROcodone-acetaminophen (NORCO) 5-325 MG per tab 1 tablet, 1 tablet, Oral, Q4H PRN **OR** HYDROcodone-acetaminophen (NORCO) 5-325 MG per tab 2 tablet, 2 tablet, Oral, Q4H PRN  •  ondansetron HCl (ZOFRAN) injection 4 mg, 4 mg, Int 41.1   MCV  88.4  88.9   MCH  30.3  29.7   MCHC  34.3  33.4   RDW  13.9  13.8   WBC  13.7*  9.6   PLT  371  321         Recent Labs   Lab  09/27/18   1832  09/28/18   0418   GLU  101*  105*   BUN  10  17   CREATSERUM  0.94  1.01   GFRAA  >60  >60   GFRNAA

## 2018-09-28 NOTE — ANESTHESIA PROCEDURE NOTES
ANESTHESIA INTUBATION  Urgency: elective      General Information and Staff    Patient location during procedure: OR  Anesthesiologist: Susie Chandler MD  Resident/CRNA: Cheo Sandra CRNA  Performed: CRNA     Indications and Patient Condition  Indications

## 2018-09-28 NOTE — CONSULTS
[facility]    PATIENT'S NAME: Rose Mary Broussard   ATTENDING PHYSICIAN: Salbador Casper MD   CONSULTING PHYSICIAN: Daniel Powers MD   PATIENT ACCOUNT#:   [id number]    LOCATION:  [address] [place] [address] #:   [id number]       DATE OF BIRTH:  [birth year] approximately 3 mm in diameter. Minimal tenderness in the area. No pain with passive range of motion of the knee. Normal ligamentous exam.  No pain with passive range of motion of the hip.       LABORATORY DATA:  White blood cell count of 13.7 at time of

## 2018-09-28 NOTE — CONSULTS
Pt seen and examined. Full consult dictated. Impression:  Left knee prepatellar bursitis, cellulitis. Recommend surgical I&D later today. NPO.

## 2018-09-28 NOTE — ANESTHESIA POSTPROCEDURE EVALUATION
Patient: Lakeshia Arthur    Procedure Summary     Date:  09/28/18 Room / Location:  [address] [place] [address] MAIN OR 10 / [address] [place] [address] MAIN OR    Anesthesia Start:  3861 Anesthesia Stop:  3868    Procedure:  EXTREMITY LOWER IRRIGATION & DEBRIDEMENT (Left Knee) Diagnosis:       Prepatellar bu

## 2018-09-28 NOTE — PROGRESS NOTES
CC:  Follow - Up (Pt presents to clinic for routine f/up. )      Hx of CC:  ONSET OF LEFT KNEE REDNESS/SWELLING/PAIN YESTERDAY. S/P LEFT LATERAL KNEE SKIN WOUND WHICH HAS NOW CLOSED. NEEDING REFILL OF PAIN MEDS.     Vitals:    09/27/18  1159   BP: (!) 1

## 2018-09-29 PROCEDURE — 99232 SBSQ HOSP IP/OBS MODERATE 35: CPT | Performed by: HOSPITALIST

## 2018-09-29 RX ORDER — SODIUM CHLORIDE 9 MG/ML
INJECTION, SOLUTION INTRAVENOUS
Status: COMPLETED
Start: 2018-09-29 | End: 2018-09-29

## 2018-09-29 NOTE — OPERATIVE REPORT
[facility]    PATIENT'S NAME: Marilou Lora   ATTENDING PHYSICIAN: Velasquez Vidal MD   OPERATING PHYSICIAN: Daniel Granda MD   PATIENT ACCOUNT#:   [id number]    LOCATION:  [address] [place] [address] #:   [id number]       DATE OF BIRTH:  [birth year]/ exsanguination. Tourniquet was inflated to 250 mmHg. Next, a longitudinal incision was made over the anterior aspect of the knee along the lateral border of the patella. Sharp dissection was carried out down through bursal tissue.   When we entered the p

## 2018-09-29 NOTE — PHYSICAL THERAPY NOTE
PHYSICAL THERAPY QUICK EVALUATION - INPATIENT    Room Number: 402/402-A  Evaluation Date: 9/29/2018  Presenting Problem: L septic knee on KI  Physician Order: PT Eval and Treat    Problem List  Principal Problem:    Cellulitis of left knee  Active Proble bedside commode, etc.): None   -   Moving from lying on back to sitting on the side of the bed?: None   How much help from another person does the patient currently need. ..   -   Moving to and from a bed to a chair (including a wheelchair)?: None   -   James Sherman gt on plain surface without any use of an AD with good steady gt WBAT on the L LE and is able to navigate stairs with railing use with mod I. All question and concerns address. Pt wanted to have RW for home to assist in his shopping .  Nursing is aware of t

## 2018-09-29 NOTE — PROGRESS NOTES
Subjective: No complaints. Pain controlled.     Objective:    Patient Vitals for the past 24 hrs:   BP Temp Temp src Pulse Resp SpO2   09/29/18 0334 114/61 98 °F (36.7 °C) Oral 68 16 95 %   09/28/18 2337 109/63 98 °F (36.7 °C) Oral 71 20 93 %   09/28/18 195

## 2018-09-30 PROCEDURE — 99232 SBSQ HOSP IP/OBS MODERATE 35: CPT | Performed by: HOSPITALIST

## 2018-09-30 NOTE — PLAN OF CARE
DISCHARGE PLANNING    • Discharge to home or other facility with appropriate resources Progressing        HEMATOLOGIC - ADULT    • Maintains hematologic stability Progressing    • Free from bleeding injury Progressing        Impaired Activities of Daily Shaggy Ashley

## 2018-09-30 NOTE — PROGRESS NOTES
[facility] HOSP - [facility]    Progress Note    Samuel Teresa Patient Status:  Inpatient    7/10/1960 MRN [id number]   Location [facility] 4W/SW/SE Attending Juanito Pearce MD   Hosp Day # 3 PCP Dorothy Aguilar DO       Subjective:   Samuel Teresa is a(n sulfate (PF) 2 MG/ML injection 2 mg, 2 mg, Intravenous, Q2H PRN **OR** morphINE sulfate (PF) 4 MG/ML injection 4 mg, 4 mg, Intravenous, Q2H PRN  •  ondansetron HCl (ZOFRAN) injection 4 mg, 4 mg, Intravenous, Q6H PRN  •  HYDROcodone-acetaminophen (NORCO) 5- Intramuscular, Once    Assessment and Plan:   Cellulitis of left knee  With prepatellar bursitis  S/p R knee Bursa I+D  Cont IV abx, will dc on oral most likely  Pain control adequate  Await final cultures prior to discharge  PT/OT following    Other Issue

## 2018-09-30 NOTE — PROGRESS NOTES
[facility] HOSP - [facility]    Progress Note    Mireya Susanna Patient Status:  Inpatient    7/10/1960 MRN [id number]   Location [facility] 4W/SW/SE Attending Alejandra Mcmahon MD   Hosp Day # 3 PCP Neli Magallno DO       Subjective:   Mireyabon Mullins is a(n

## 2018-09-30 NOTE — OCCUPATIONAL THERAPY NOTE
OT orders received. RN approving of contact. Pt received up in sitting eob, alert and oriented x 4.  Pt currently ambulating in the halls and attending to personal care needs w/o assist. Pt currently unable to identify OT specific areas of concern in antici

## 2018-10-01 VITALS
SYSTOLIC BLOOD PRESSURE: 112 MMHG | BODY MASS INDEX: 32.58 KG/M2 | OXYGEN SATURATION: 99 % | HEIGHT: 69 IN | HEART RATE: 60 BPM | TEMPERATURE: 98 F | WEIGHT: 220 LBS | DIASTOLIC BLOOD PRESSURE: 46 MMHG | RESPIRATION RATE: 18 BRPM

## 2018-10-01 PROCEDURE — 99239 HOSP IP/OBS DSCHRG MGMT >30: CPT | Performed by: HOSPITALIST

## 2018-10-01 RX ORDER — CLINDAMYCIN HYDROCHLORIDE 300 MG/1
300 CAPSULE ORAL 3 TIMES DAILY
Qty: 42 CAPSULE | Refills: 0 | Status: SHIPPED | OUTPATIENT
Start: 2018-10-01 | End: 2018-10-15

## 2018-10-01 RX ORDER — ASPIRIN 325 MG
325 TABLET ORAL 2 TIMES DAILY
Qty: 28 TABLET | Refills: 0 | Status: SHIPPED | OUTPATIENT
Start: 2018-10-01 | End: 2018-10-15

## 2018-10-01 RX ORDER — POLYETHYLENE GLYCOL 3350 17 G/17G
17 POWDER, FOR SOLUTION ORAL DAILY PRN
Qty: 30 EACH | Refills: 0 | Status: SHIPPED | OUTPATIENT
Start: 2018-10-01 | End: 2018-10-31

## 2018-10-01 RX ORDER — CLINDAMYCIN HYDROCHLORIDE 300 MG/1
300 CAPSULE ORAL ONCE
Status: COMPLETED | OUTPATIENT
Start: 2018-10-01 | End: 2018-10-01

## 2018-10-01 NOTE — CM/SW NOTE
SW has been notified that patient has concerns about his housing and inability to drive. SW provided housing resources and [facility].  rehabilitation program information.     Brady Esposito, DEA, x. [phone]

## 2018-10-01 NOTE — PAYOR COMM NOTE
REF: [id number]  APPROVED 9/27/18 - 9/29/18 REQUESTING ADDITIONAL DAYS      9/29/18  Subjective:   Jennifer Gibbons is a(n) 63year old male was seen and examined  Pain controlled in L knee  No cp, sob, f,c,n,v abd pain or HA  No other complaints     Objective: PRN  •  ondansetron HCl (ZOFRAN) injection 4 mg, 4 mg, Intravenous, Q6H PRN  •  HYDROcodone-acetaminophen (NORCO) 5-325 MG per tab 1 tablet, 1 tablet, Oral, Q4H PRN  •  clindamycin in D5W (CLEOCIN) premix 600mg/50ml, 600 mg, Intravenous, Q8H  •  Normal Sal abx, will cont  Cont pain control  Await final cultures  Will consult PT/OT     Other Issues  HTN  HL  DJD of cervical and lumbar spine  GERD w/ Patel's esophagus     DVT PPx: ASA BID  Full Code     Dispo: pending cultures        Results:            Rece morphINE sulfate (PF) 2 MG/ML injection 2 mg, 2 mg, Intravenous, Q4H PRN  •  Normal Saline Flush 0.9 % injection 10 mL, 10 mL, Intravenous, PRN  •  docusate sodium (COLACE) cap 100 mg, 100 mg, Oral, BID  •  PEG 3350 (MIRALAX) powder packet 17 g, 17 g, Oral tab 10 mg, 10 mg, Oral, Daily  •  pregabalin (LYRICA) cap 150 mg, 150 mg, Oral, BID  •  famoTIDine (PEPCID) tab 40 mg, 40 mg, Oral, Daily  •  topiramate (Topamax) tab 50 mg, 50 mg, Oral, Daily  •  TraZODone HCl (DESYREL) tab 100 mg, 100 mg, Oral, Nightly P

## 2018-10-01 NOTE — PLAN OF CARE
DISCHARGE PLANNING    • Discharge to home or other facility with appropriate resources Adequate for Discharge        HEMATOLOGIC - ADULT    • Maintains hematologic stability Adequate for Discharge    • Free from bleeding injury Adequate for Discharge potential fall risks. Patient has been educated on hand washing, labs have been reviewed. No signs and symptoms of infection systemically or locally. Surgical dressing in place, CDI.

## 2018-10-01 NOTE — PLAN OF CARE
DISCHARGE PLANNING    • Discharge to home or other facility with appropriate resources Progressing        HEMATOLOGIC - ADULT    • Maintains hematologic stability Progressing    • Free from bleeding injury Progressing        Impaired Activities of Daily Kecia Noss

## 2018-10-01 NOTE — DISCHARGE SUMMARY
[facility] - [facility]    Discharge Summary    Olive Ansari Patient Status:  Inpatient    7/10/1960 MRN [id number]   Location [facility] 4W/SW/SE Attending Hitesh Ramirez MD   [facility] Day # 4 PCP Yari Hagen DO     Date of Admission: 2018 General appearance: alert, appears stated age and cooperative  Pulmonary:  clear to auscultation bilaterally  Cardiovascular: S1, S2 normal, no murmur, click, rub or gallop, regular rate and rhythm  Abdominal: soft, non-tender; bowel sounds normal; no ma 10/15/2018  Quantity:  28 tablet  Refills:  0     Clindamycin HCl 300 MG Caps  Commonly known as:  CLEOCIN      Take 1 capsule (300 mg total) by mouth 3 (three) times daily for 14 days.    Stop taking on:  10/15/2018  Quantity:  42 capsule  Refills:  0 PRINIVIL,ZESTRIL      Take 1 tablet (10 mg total) by mouth 2 (two) times daily.    Quantity:  60 tablet  Refills:  11     Meloxicam 15 MG Tabs      TAKE ONE TABLET BY MOUTH DAILY AS NEEDED FOR PAIN   Quantity:  30 tablet  Refills:  2     Montelukast Sodium MG Tabs  · Clindamycin HCl 300 MG Caps         Follow up Visits:  Follow-up with pcp in 1 week    Follow up Labs: n/a     Other Discharge Instructions: f/u with Dr. Tracey Rodriguez MD  10/1/2018  1:07 PM    > 35 min

## 2018-10-01 NOTE — CM/SW NOTE
SW met with the patient at bedside. The patient lives with his friends in a single level house, 6 steps to enter. The patient responds being independent prior to admission with all ADL's, ambulation and driving. Patient denies use of any DME.      Cultures

## 2018-10-03 ENCOUNTER — TELEPHONE (OUTPATIENT)
Dept: INTERNAL MEDICINE CLINIC | Facility: CLINIC | Age: 58
End: 2018-10-03

## 2018-10-03 ENCOUNTER — PATIENT OUTREACH (OUTPATIENT)
Dept: CASE MANAGEMENT | Age: 58
End: 2018-10-03

## 2018-10-03 DIAGNOSIS — Z02.9 ENCOUNTERS FOR ADMINISTRATIVE PURPOSE: ICD-10-CM

## 2018-10-03 PROCEDURE — 1111F DSCHRG MED/CURRENT MED MERGE: CPT

## 2018-10-03 NOTE — PROGRESS NOTES
Condition Update Post Discharge   Discharge Date: 10/1/18  Contact Date: 10/3/2018    Consent Verification:  Assessment Completed With: Patient  HIPAA Verified? Yes    General:   • How have you been since your discharge from the hospital/facility?  Pt drainage, irrigation, and debridement by Dr. Tommy Ribeiro. • Before leaving the hospital was your diagnoses explained to you? Yes  • Do you have any questions about your diagnoses?  No  • Are you able to perform normal daily activities of living as you have pr Rfl: 2   Meloxicam 15 MG Oral Tab TAKE ONE TABLET BY MOUTH DAILY AS NEEDED FOR PAIN Disp: 30 tablet Rfl: 2   Cyclobenzaprine HCl 10 MG Oral Tab TAKE 1 TABLET(10 MG) BY MOUTH THREE TIMES DAILY AS NEEDED FOR MUSCLE SPASMS Disp: 90 tablet Rfl: 2   Fluticasone importance of getting the antibiotic started as soon as possible. • Are there any reasons that keep you from taking your medication as prescribed? No      Referrals/orders at D/C:  Home Health ordered at D/C? No- pt denies the need for HHC.         DME o to call NCM back if he does not have his concerns addressed. [x]  Discharge Summary, Discharge medications reviewed/discussed/and reconciled with patient, and orders reviewed and discussed.  Any changes or updates to medications and or orders sent to

## 2018-10-03 NOTE — TELEPHONE ENCOUNTER
Contacted pt for condition update since d/c. Pt currently does not have his HFU appt scheduled. An appt was offered but pt stated he would like to call Dr. Jonnie Rodriguez office to schedule first and then call Dr. Smiley Richmond office back.  HFU appt is recommended a

## 2018-10-05 ENCOUNTER — TELEPHONE (OUTPATIENT)
Dept: INTERNAL MEDICINE CLINIC | Facility: CLINIC | Age: 58
End: 2018-10-05

## 2018-10-05 NOTE — TELEPHONE ENCOUNTER
Bailee Braxton from [facility] left msg on nurse line request a call back to discuss medical question   Call back # [phone]

## 2018-10-09 ENCOUNTER — TELEPHONE (OUTPATIENT)
Dept: INTERNAL MEDICINE CLINIC | Facility: CLINIC | Age: 58
End: 2018-10-09

## 2018-10-09 NOTE — TELEPHONE ENCOUNTER
Patient left  for Mo. He states he has been trying to get a hold of her for several days. Patient states he needs to be seen by Dr. Eze Badillo to get sutures out.

## 2018-10-11 ENCOUNTER — TELEPHONE (OUTPATIENT)
Dept: INTERNAL MEDICINE CLINIC | Facility: CLINIC | Age: 58
End: 2018-10-11

## 2018-10-11 ENCOUNTER — OFFICE VISIT (OUTPATIENT)
Dept: INTERNAL MEDICINE CLINIC | Facility: CLINIC | Age: 58
End: 2018-10-11
Payer: MEDICAID

## 2018-10-11 VITALS
WEIGHT: 222 LBS | HEART RATE: 75 BPM | HEIGHT: 69 IN | RESPIRATION RATE: 18 BRPM | BODY MASS INDEX: 32.88 KG/M2 | OXYGEN SATURATION: 99 % | SYSTOLIC BLOOD PRESSURE: 132 MMHG | DIASTOLIC BLOOD PRESSURE: 68 MMHG

## 2018-10-11 DIAGNOSIS — Z09 HOSPITAL DISCHARGE FOLLOW-UP: Primary | ICD-10-CM

## 2018-10-11 DIAGNOSIS — M51.16 LUMBAR DISC DISEASE WITH RADICULOPATHY: ICD-10-CM

## 2018-10-11 PROCEDURE — 99213 OFFICE O/P EST LOW 20 MIN: CPT | Performed by: FAMILY MEDICINE

## 2018-10-11 NOTE — TELEPHONE ENCOUNTER
Requesting call fom Dr Eze Badillo says he feels worse than when he was here  Severe pain on his knee cap.

## 2018-10-13 NOTE — PROGRESS NOTES
CC:  Suture Removal (Pt presents to clinic for suture removal. )      Hx of CC:  S/P HOSPITALIZATION FOR LEFT KNEE CELLULITIS AND BURSITIS.   WAS HOSPITALIZED X 4 DAYS WITH IV ANTIBIOTICS AND HAD SURGICAL DEBRIDEMENT OF KNEE 2 WEEKS AGO-->HERE FOR SUTURE RE

## 2018-10-17 ENCOUNTER — TELEPHONE (OUTPATIENT)
Dept: INTERNAL MEDICINE CLINIC | Facility: CLINIC | Age: 58
End: 2018-10-17

## 2018-10-17 NOTE — TELEPHONE ENCOUNTER
Patient called requesting a change in dosage on the HYDROcodone-acetaminophen (NORCO) , pt would like to be able to take 2 tabs for pain. And also, has questions regarding TRAZODONE  MG.  States that it makes him quote feels like a walking zomb

## 2018-10-18 NOTE — TELEPHONE ENCOUNTER
Spoke with patient--cannot double up on pain meds-->overdose.   Mailed rx for zolpidem to preferred mailing address:  [facility] [address] [facility]   [address], [address]

## 2018-10-26 DIAGNOSIS — R51.9 NONINTRACTABLE HEADACHE, UNSPECIFIED CHRONICITY PATTERN, UNSPECIFIED HEADACHE TYPE: ICD-10-CM

## 2018-10-26 RX ORDER — TOPIRAMATE 25 MG/1
TABLET ORAL
Qty: 90 TABLET | Refills: 0 | Status: SHIPPED | OUTPATIENT
Start: 2018-10-26 | End: 2018-11-09

## 2018-10-30 ENCOUNTER — TELEPHONE (OUTPATIENT)
Dept: INTERNAL MEDICINE CLINIC | Facility: CLINIC | Age: 58
End: 2018-10-30

## 2018-10-30 RX ORDER — AMOXICILLIN 875 MG/1
875 TABLET, COATED ORAL 2 TIMES DAILY
Qty: 14 TABLET | Refills: 0 | Status: SHIPPED | OUTPATIENT
Start: 2018-10-30 | End: 2018-11-09 | Stop reason: ALTCHOICE

## 2018-10-30 NOTE — TELEPHONE ENCOUNTER
Patient is calling to request medication for his current issues. States that he has had a fever of 101 temp, since Sunday and was wondering if he can have an antibiotic until he can get in to his appointment. Also, would like to speak only you.  (Dr. Lizbeth Landau)

## 2018-11-08 DIAGNOSIS — R51.9 RIGHT-SIDED HEADACHE: ICD-10-CM

## 2018-11-08 DIAGNOSIS — M47.812 OSTEOARTHRITIS OF CERVICAL SPINE, UNSPECIFIED SPINAL OSTEOARTHRITIS COMPLICATION STATUS: ICD-10-CM

## 2018-11-08 DIAGNOSIS — R68.89 EXERCISE INTOLERANCE: ICD-10-CM

## 2018-11-08 DIAGNOSIS — I10 HTN, GOAL BELOW 140/90: ICD-10-CM

## 2018-11-08 DIAGNOSIS — E78.2 MIXED HYPERLIPIDEMIA: ICD-10-CM

## 2018-11-08 DIAGNOSIS — S96.911A RIGHT FOOT STRAIN, INITIAL ENCOUNTER: ICD-10-CM

## 2018-11-08 DIAGNOSIS — M51.16 LUMBAR DISC DISEASE WITH RADICULOPATHY: ICD-10-CM

## 2018-11-08 DIAGNOSIS — J44.9 ASTHMA WITH COPD (CHRONIC OBSTRUCTIVE PULMONARY DISEASE) (HCC): ICD-10-CM

## 2018-11-08 RX ORDER — MONTELUKAST SODIUM 10 MG/1
TABLET ORAL
Qty: 90 TABLET | Refills: 0 | Status: CANCELLED | OUTPATIENT
Start: 2018-11-08

## 2018-11-08 RX ORDER — MELOXICAM 15 MG/1
TABLET ORAL
Qty: 30 TABLET | Refills: 0 | Status: CANCELLED | OUTPATIENT
Start: 2018-11-08

## 2018-11-09 ENCOUNTER — TELEPHONE (OUTPATIENT)
Dept: INTERNAL MEDICINE CLINIC | Facility: CLINIC | Age: 58
End: 2018-11-09

## 2018-11-09 DIAGNOSIS — S96.911A RIGHT FOOT STRAIN, INITIAL ENCOUNTER: ICD-10-CM

## 2018-11-09 DIAGNOSIS — F51.04 PSYCHOPHYSIOLOGICAL INSOMNIA: ICD-10-CM

## 2018-11-09 DIAGNOSIS — K21.00 GERD WITH ESOPHAGITIS: ICD-10-CM

## 2018-11-09 DIAGNOSIS — E78.2 MIXED HYPERLIPIDEMIA: ICD-10-CM

## 2018-11-09 DIAGNOSIS — M51.16 LUMBAR DISC DISEASE WITH RADICULOPATHY: ICD-10-CM

## 2018-11-09 DIAGNOSIS — R51.9 NONINTRACTABLE HEADACHE, UNSPECIFIED CHRONICITY PATTERN, UNSPECIFIED HEADACHE TYPE: ICD-10-CM

## 2018-11-09 DIAGNOSIS — J44.9 ASTHMA WITH COPD (CHRONIC OBSTRUCTIVE PULMONARY DISEASE) (HCC): ICD-10-CM

## 2018-11-09 DIAGNOSIS — I10 HTN, GOAL BELOW 140/90: ICD-10-CM

## 2018-11-09 RX ORDER — TRAZODONE HYDROCHLORIDE 100 MG/1
100 TABLET ORAL NIGHTLY PRN
Qty: 30 TABLET | Refills: 5 | Status: SHIPPED | OUTPATIENT
Start: 2018-11-09 | End: 2019-06-17

## 2018-11-09 RX ORDER — MONTELUKAST SODIUM 10 MG/1
10 TABLET ORAL DAILY
Qty: 30 TABLET | Refills: 11 | Status: SHIPPED | OUTPATIENT
Start: 2018-11-09 | End: 2019-10-22

## 2018-11-09 RX ORDER — FLUTICASONE PROPIONATE AND SALMETEROL 232; 14 UG/1; UG/1
1 POWDER, METERED RESPIRATORY (INHALATION) 2 TIMES DAILY
Qty: 1 EACH | Refills: 5 | Status: SHIPPED | OUTPATIENT
Start: 2018-11-09 | End: 2019-03-21

## 2018-11-09 RX ORDER — ALBUTEROL SULFATE 90 UG/1
AEROSOL, METERED RESPIRATORY (INHALATION)
Qty: 18 G | Refills: 5 | Status: SHIPPED | OUTPATIENT
Start: 2018-11-09 | End: 2019-09-30

## 2018-11-09 RX ORDER — ATORVASTATIN CALCIUM 40 MG/1
40 TABLET, FILM COATED ORAL NIGHTLY
Qty: 30 TABLET | Refills: 11 | Status: SHIPPED | OUTPATIENT
Start: 2018-11-09 | End: 2019-01-09

## 2018-11-09 RX ORDER — ZOLPIDEM TARTRATE 10 MG/1
10 TABLET ORAL NIGHTLY PRN
Qty: 30 TABLET | Refills: 5 | Status: SHIPPED | OUTPATIENT
Start: 2018-11-09 | End: 2019-06-17

## 2018-11-09 RX ORDER — CYCLOBENZAPRINE HCL 10 MG
TABLET ORAL
Qty: 90 TABLET | Refills: 0 | Status: SHIPPED | OUTPATIENT
Start: 2018-11-09 | End: 2018-11-16 | Stop reason: ALTCHOICE

## 2018-11-09 RX ORDER — TOPIRAMATE 50 MG/1
TABLET, FILM COATED ORAL
Qty: 30 TABLET | Refills: 5 | Status: SHIPPED | OUTPATIENT
Start: 2018-11-09 | End: 2019-06-17

## 2018-11-09 RX ORDER — MELOXICAM 15 MG/1
TABLET ORAL
Qty: 30 TABLET | Refills: 2 | Status: SHIPPED | OUTPATIENT
Start: 2018-11-09 | End: 2019-04-22

## 2018-11-09 RX ORDER — LISINOPRIL 10 MG/1
10 TABLET ORAL 2 TIMES DAILY
Qty: 60 TABLET | Refills: 11 | Status: SHIPPED | OUTPATIENT
Start: 2018-11-09 | End: 2019-10-22

## 2018-11-09 RX ORDER — TOPIRAMATE 25 MG/1
25 TABLET ORAL DAILY
Qty: 30 TABLET | Refills: 5 | Status: SHIPPED | OUTPATIENT
Start: 2018-11-09 | End: 2019-06-17

## 2018-11-09 RX ORDER — ASPIRIN 81 MG/1
TABLET, COATED ORAL
Qty: 360 TABLET | Refills: 0 | Status: SHIPPED | OUTPATIENT
Start: 2018-11-09 | End: 2019-08-20

## 2018-11-09 RX ORDER — RANITIDINE 300 MG/1
300 TABLET ORAL NIGHTLY
Qty: 30 TABLET | Refills: 5 | Status: SHIPPED | OUTPATIENT
Start: 2018-11-09 | End: 2018-12-10

## 2018-11-14 ENCOUNTER — TELEPHONE (OUTPATIENT)
Dept: INTERNAL MEDICINE CLINIC | Facility: CLINIC | Age: 58
End: 2018-11-14

## 2018-11-16 ENCOUNTER — OFFICE VISIT (OUTPATIENT)
Dept: INTERNAL MEDICINE CLINIC | Facility: CLINIC | Age: 58
End: 2018-11-16
Payer: MEDICAID

## 2018-11-16 VITALS
TEMPERATURE: 98 F | DIASTOLIC BLOOD PRESSURE: 88 MMHG | BODY MASS INDEX: 33.33 KG/M2 | HEIGHT: 69 IN | OXYGEN SATURATION: 100 % | SYSTOLIC BLOOD PRESSURE: 130 MMHG | WEIGHT: 225 LBS | HEART RATE: 92 BPM

## 2018-11-16 DIAGNOSIS — M47.812 OSTEOARTHRITIS OF CERVICAL SPINE, UNSPECIFIED SPINAL OSTEOARTHRITIS COMPLICATION STATUS: ICD-10-CM

## 2018-11-16 DIAGNOSIS — K21.00 GERD WITH ESOPHAGITIS: ICD-10-CM

## 2018-11-16 DIAGNOSIS — M51.16 LUMBAR DISC DISEASE WITH RADICULOPATHY: Primary | ICD-10-CM

## 2018-11-16 DIAGNOSIS — I10 HTN, GOAL BELOW 140/90: ICD-10-CM

## 2018-11-16 DIAGNOSIS — Q76.1 CERVICAL FUSION SYNDROME: ICD-10-CM

## 2018-11-16 DIAGNOSIS — H81.11 BENIGN PAROXYSMAL POSITIONAL VERTIGO OF RIGHT EAR: ICD-10-CM

## 2018-11-16 DIAGNOSIS — S33.8XXA SPRAIN OF GROIN, INITIAL ENCOUNTER: ICD-10-CM

## 2018-11-16 PROCEDURE — 99214 OFFICE O/P EST MOD 30 MIN: CPT | Performed by: FAMILY MEDICINE

## 2018-11-16 RX ORDER — OMEPRAZOLE 40 MG/1
40 CAPSULE, DELAYED RELEASE ORAL DAILY
Qty: 90 CAPSULE | Refills: 1 | Status: SHIPPED | OUTPATIENT
Start: 2018-11-16 | End: 2018-12-10

## 2018-11-16 RX ORDER — HYDROCODONE BITARTRATE AND ACETAMINOPHEN 10; 325 MG/1; MG/1
1 TABLET ORAL EVERY 4 HOURS PRN
Qty: 180 TABLET | Refills: 0 | Status: SHIPPED | OUTPATIENT
Start: 2018-11-16 | End: 2018-12-14

## 2018-11-16 RX ORDER — MECLIZINE HYDROCHLORIDE CHEWABLE TABLETS 25 MG/1
1 TABLET, CHEWABLE ORAL 3 TIMES DAILY PRN
Qty: 50 TABLET | Refills: 0 | Status: SHIPPED | OUTPATIENT
Start: 2018-11-16 | End: 2018-12-16

## 2018-11-17 NOTE — PROGRESS NOTES
CC:  Follow - Up (Pt presents to clinic for routine f/up. )      Hx of CC:  HAS HAD A FEW EPISODES OF VERTIGO WHEN TURNING HEAD FOLLOWED BY [facility] OVER THE PAST WEEK. ALSO WITH GASTRITIS/GERD NOT FULLY CONTROLLED WITH RANITIDINE 300 MG AT HS.   HA by mouth daily. Dispense: 90 capsule; Refill: 1    3. Osteoarthritis of cervical spine, unspecified spinal osteoarthritis complication status    - HYDROcodone-acetaminophen (NORCO)  MG Oral Tab;  Take 1 tablet by mouth every 4 (four) hours as needed

## 2018-12-02 NOTE — PLAN OF CARE
DISCHARGE PLANNING    • Discharge to home or other facility with appropriate resources Progressing        PAIN - ADULT    • Verbalizes/displays adequate comfort level or patient's stated pain goal Progressing        Patient Centered Care    • Patient prefe 02-Dec-2018 13:54

## 2018-12-04 ENCOUNTER — TELEPHONE (OUTPATIENT)
Dept: INTERNAL MEDICINE CLINIC | Facility: CLINIC | Age: 58
End: 2018-12-04

## 2018-12-04 DIAGNOSIS — Z12.11 COLON CANCER SCREENING: Primary | ICD-10-CM

## 2018-12-04 RX ORDER — AZITHROMYCIN 250 MG/1
TABLET, FILM COATED ORAL
Qty: 1 PACKAGE | Refills: 0 | Status: SHIPPED | OUTPATIENT
Start: 2018-12-04 | End: 2019-01-17 | Stop reason: ALTCHOICE

## 2018-12-04 NOTE — TELEPHONE ENCOUNTER
Patient is asking if he can have a antibiotic order put in for infection. And also is asking about his  HYDROcodone-acetaminophen ([address].  Please call back at [phone]

## 2018-12-05 ENCOUNTER — TELEPHONE (OUTPATIENT)
Dept: INTERNAL MEDICINE CLINIC | Facility: CLINIC | Age: 58
End: 2018-12-05

## 2018-12-05 ENCOUNTER — TELEPHONE (OUTPATIENT)
Dept: GASTROENTEROLOGY | Facility: CLINIC | Age: 58
End: 2018-12-05

## 2018-12-05 NOTE — H&P
[address] [facility] [address] Gastroenterology                                                                                                  Clinic History and Physical     Pa sleep apnea. Pertinent Family Hx:  - No family hx of esophageal, gastric or colon cancer  - No family history of IBD.     Prior endoscopies:  2008, 2010 or 2012 EGD performed in [facility], findings per pt: Patel's esophagus, PUD, 1-3 yr recall THE FIRST DAY AND THEN ONE TABLET DAILY THEREAFTER FOR A TOTAL OF FIVE DAYS Disp: 1 Package Rfl: 0   Meclizine HCl 25 MG Oral Chew Tab Chew 1 tablet by mouth 3 (three) times daily as needed (vertigo).  Disp: 50 tablet Rfl: 0   ASPIRIN LOW DOSE 81 MG Oral Ta Fluticasone Propionate 50 MCG/ACT Nasal Suspension USE 2 SPRAYS IN EACH NOSTRIL DAILY Disp: 1 Bottle Rfl: 5   albuterol sulfate (2.5 MG/3ML) 0.083% Inhalation Nebu Soln USE 1 VIAL VIA NEBULIZER EVERY 8 HOURS AS NEEDED FOR WHEEZING Disp:  Rfl: 5   Respira rectal exam deferred  : no CVA tenderness  Skin: dry, warm, no jaundice  Ext: no cyanosis, clubbing or edema is evident.    Neuro: Alert and oriented x4, and patient is having movements of all 4 extremities   Psych: Pt has a normal mood and affect, behavi OTC fiber and/or probiotics. No prior abdominal imaging has been completed.   We discussed a follow-up CT scan for evaluation of this pain which the patient is amenable to.    3.  Screening for colon cancer: No prior colonoscopy with no family history of c placement, etc.] as indicated.     EGD consent: I have discussed the risks (including risk of delayed/missed diagnosis), benefits, and alternatives to upper endoscopy/enteroscopy with the patient [who demonstrated understanding], including but not limited t

## 2018-12-05 NOTE — TELEPHONE ENCOUNTER
Drug interaction with the Zpack and Trazodone can cause QT prolongation    Please let pharmacy know ASAP, pt was picking up medication yesterday but they did not get a response

## 2018-12-05 NOTE — TELEPHONE ENCOUNTER
ROXANNE to [facility] MIGUEL--this pt was transferred from phone room. He has never been seen by our GI before. See below. States he not not had abdominal pain, fever, black stools, or blood in stool since Thanksgiving.  He does have occasional loose stool, but normal

## 2018-12-07 ENCOUNTER — TELEPHONE (OUTPATIENT)
Dept: INTERNAL MEDICINE CLINIC | Facility: CLINIC | Age: 58
End: 2018-12-07

## 2018-12-07 NOTE — TELEPHONE ENCOUNTER
Pt states the med given to him for vertigo not working at all. Has bad HA pain. Large lump has developed on R side of neck. When he rubs the lump it makes popping sounds.

## 2018-12-10 ENCOUNTER — OFFICE VISIT (OUTPATIENT)
Dept: GASTROENTEROLOGY | Facility: CLINIC | Age: 58
End: 2018-12-10
Payer: MEDICAID

## 2018-12-10 ENCOUNTER — TELEPHONE (OUTPATIENT)
Dept: GASTROENTEROLOGY | Facility: CLINIC | Age: 58
End: 2018-12-10

## 2018-12-10 VITALS
HEART RATE: 73 BPM | SYSTOLIC BLOOD PRESSURE: 130 MMHG | BODY MASS INDEX: 33.12 KG/M2 | DIASTOLIC BLOOD PRESSURE: 85 MMHG | WEIGHT: 223.63 LBS | HEIGHT: 69 IN

## 2018-12-10 DIAGNOSIS — R10.84 GENERALIZED ABDOMINAL PAIN: ICD-10-CM

## 2018-12-10 DIAGNOSIS — R19.5 DARK STOOLS: ICD-10-CM

## 2018-12-10 DIAGNOSIS — Z12.11 SCREENING FOR COLON CANCER: ICD-10-CM

## 2018-12-10 DIAGNOSIS — Z12.11 COLON CANCER SCREENING: Primary | ICD-10-CM

## 2018-12-10 DIAGNOSIS — R19.4 ALTERED BOWEL HABITS: ICD-10-CM

## 2018-12-10 DIAGNOSIS — R10.9 ABDOMINAL PAIN, UNSPECIFIED ABDOMINAL LOCATION: ICD-10-CM

## 2018-12-10 DIAGNOSIS — K22.719 BARRETT'S ESOPHAGUS WITH DYSPLASIA: Primary | ICD-10-CM

## 2018-12-10 PROCEDURE — 99214 OFFICE O/P EST MOD 30 MIN: CPT | Performed by: NURSE PRACTITIONER

## 2018-12-10 PROCEDURE — 99212 OFFICE O/P EST SF 10 MIN: CPT | Performed by: NURSE PRACTITIONER

## 2018-12-10 RX ORDER — PANTOPRAZOLE SODIUM 40 MG/1
40 TABLET, DELAYED RELEASE ORAL
Qty: 30 TABLET | Refills: 5 | Status: SHIPPED | OUTPATIENT
Start: 2018-12-10 | End: 2019-01-09

## 2018-12-10 NOTE — PATIENT INSTRUCTIONS
1. Schedule colonoscopy/EGD with first available MD with [facility]    2.  bowel prep from pharmacy - split dose Colyte     3. Continue all medications for procedure except: see office visit note    4. Read all bowel prep instructions carefully    5.  AVOID

## 2018-12-10 NOTE — TELEPHONE ENCOUNTER
Scheduled for: Colonoscopy [phone]   Provider Name: Dr Henry Pressley  Date:  Wed 12/18/18  Location:  [facility]  Sedation:  NICK Winston  Time:  11:15 am  Prep: split dose colyte  Meds/Allergies Reconciled?:  NKDA  Diagnosis with codes:  Colon cancer screening

## 2018-12-11 ENCOUNTER — TELEPHONE (OUTPATIENT)
Dept: GASTROENTEROLOGY | Facility: CLINIC | Age: 58
End: 2018-12-11

## 2018-12-11 NOTE — TELEPHONE ENCOUNTER
Current Outpatient Medications:  PEG 3350-KCl-NaBcb-NaCl-NaSulf (COLYTE WITH FLAVOR PACKS) 240 g Oral Recon Soln As directed per GI consult notes Disp: 1 Bottle Rfl: 0     Med not covered call [phone] for PA Pt ID# [id number]

## 2018-12-11 NOTE — TELEPHONE ENCOUNTER
I contacted the pharmacist and pharmacist put Colyte through again but with unflavored packs and it went through.

## 2018-12-14 ENCOUNTER — TELEPHONE (OUTPATIENT)
Dept: INTERNAL MEDICINE CLINIC | Facility: CLINIC | Age: 58
End: 2018-12-14

## 2018-12-14 ENCOUNTER — TELEPHONE (OUTPATIENT)
Dept: GASTROENTEROLOGY | Facility: CLINIC | Age: 58
End: 2018-12-14

## 2018-12-14 DIAGNOSIS — M47.812 OSTEOARTHRITIS OF CERVICAL SPINE, UNSPECIFIED SPINAL OSTEOARTHRITIS COMPLICATION STATUS: ICD-10-CM

## 2018-12-14 DIAGNOSIS — Q76.1 CERVICAL FUSION SYNDROME: ICD-10-CM

## 2018-12-14 DIAGNOSIS — M51.16 LUMBAR DISC DISEASE WITH RADICULOPATHY: ICD-10-CM

## 2018-12-14 RX ORDER — RANITIDINE 300 MG/1
TABLET ORAL
Refills: 5 | Status: ON HOLD | COMMUNITY
Start: 2018-12-11 | End: 2018-12-18

## 2018-12-14 RX ORDER — HYDROCODONE BITARTRATE AND ACETAMINOPHEN 10; 325 MG/1; MG/1
1 TABLET ORAL EVERY 4 HOURS PRN
Qty: 180 TABLET | Refills: 0 | Status: ON HOLD | OUTPATIENT
Start: 2018-12-14 | End: 2018-12-18

## 2018-12-14 NOTE — TELEPHONE ENCOUNTER
Pt contacted and reviewed Dr. Alina Burch message below, states that he CANNOT come to have blood work done prior to 12/18/18 b/c he is in [place], [place]. I reviewed that he can schedule the CT a/p and have the blood work done the same day.  He verbalized underst

## 2018-12-14 NOTE — TELEPHONE ENCOUNTER
Returned patient call regarding questions about blood test orders and also questions regarding orders for a Norco prescription to be mailed if, possible to his home.  Please call back at [phone]

## 2018-12-14 NOTE — TELEPHONE ENCOUNTER
Patient of [address] (out of office today). Routed to OFFICE ON-CALL    Dr. Dwayne Mckeon- pt seen by [address] on 12/10/18 and she ordered CBC and CT a/p to f/u on generalized abdominal pain and dark stools. Pt is scheduled for CLN/EGD on 12/18/18.      Does he need to complete l

## 2018-12-14 NOTE — TELEPHONE ENCOUNTER
Can have cbc drawn next tues at [place] while there for Colonoscopy and EGD (h/o Mart[facility]). GI RN was able to get his pantoprazole approved.     Will need Norco refilled later this month-->mailed Rx to:  [address] [place] [address] [address], [place], [address]

## 2018-12-14 NOTE — TELEPHONE ENCOUNTER
Pt requesting clarification on labs and CT, pt asking if this needs to be completed prior to upcoming EGD/CLN heron 12/18, pls call at:[phone],thanks.

## 2018-12-16 ENCOUNTER — LAB ENCOUNTER (OUTPATIENT)
Dept: LAB | Facility: HOSPITAL | Age: 58
End: 2018-12-16
Attending: NURSE PRACTITIONER
Payer: MEDICAID

## 2018-12-16 ENCOUNTER — TELEPHONE (OUTPATIENT)
Dept: GASTROENTEROLOGY | Facility: CLINIC | Age: 58
End: 2018-12-16

## 2018-12-16 DIAGNOSIS — R19.5 DARK STOOLS: ICD-10-CM

## 2018-12-16 PROCEDURE — 85025 COMPLETE CBC W/AUTO DIFF WBC: CPT

## 2018-12-16 PROCEDURE — 36415 COLL VENOUS BLD VENIPUNCTURE: CPT

## 2018-12-17 ENCOUNTER — TELEPHONE (OUTPATIENT)
Dept: GASTROENTEROLOGY | Facility: CLINIC | Age: 58
End: 2018-12-17

## 2018-12-17 NOTE — TELEPHONE ENCOUNTER
----- Message from TALIA Dillard sent at 12/17/2018  9:12 AM CST -----  Nursing: please let the pt know his lab work looks good - no evidence of anemia - proceed w/ endoscopy as previously discussed

## 2018-12-17 NOTE — TELEPHONE ENCOUNTER
Patient of Dr. Garrett Caballero, states he will have colonoscopy and  EGD on Tuesday per Dr. Huang Suh. Had some nausea and vomiting with dark stool since Thanksgiving.   CBC done today was normal.  I reassured him that his CBC was normal.  If persistent vomiting prese

## 2018-12-17 NOTE — TELEPHONE ENCOUNTER
Pt contacted and wanted to know until what time he could continue to have clear liquids. I reviewed that he can continue to have clear liquids up until 7:15AM tomorrow, reviewed nothing red, blue, purple, milk products, honey.  I reviewed split dose prep in

## 2018-12-17 NOTE — TELEPHONE ENCOUNTER
Normal results letter generated and mailed to pt home address. Spoke to pt earlier today and confirmed procedure for tomorrow.

## 2018-12-18 ENCOUNTER — ANESTHESIA (OUTPATIENT)
Dept: ENDOSCOPY | Facility: HOSPITAL | Age: 58
End: 2018-12-18
Payer: MEDICAID

## 2018-12-18 ENCOUNTER — ANESTHESIA EVENT (OUTPATIENT)
Dept: ENDOSCOPY | Facility: HOSPITAL | Age: 58
End: 2018-12-18
Payer: MEDICAID

## 2018-12-18 ENCOUNTER — HOSPITAL ENCOUNTER (OUTPATIENT)
Facility: HOSPITAL | Age: 58
Setting detail: HOSPITAL OUTPATIENT SURGERY
Discharge: HOME | End: 2018-12-18
Attending: INTERNAL MEDICINE | Admitting: INTERNAL MEDICINE
Payer: MEDICAID

## 2018-12-18 DIAGNOSIS — K63.5 COLON POLYPS: Primary | ICD-10-CM

## 2018-12-18 DIAGNOSIS — R10.9 ABDOMINAL PAIN, UNSPECIFIED ABDOMINAL LOCATION: ICD-10-CM

## 2018-12-18 DIAGNOSIS — Z12.11 COLON CANCER SCREENING: ICD-10-CM

## 2018-12-18 DIAGNOSIS — K64.9 HEMORRHOIDS: ICD-10-CM

## 2018-12-18 DIAGNOSIS — R19.5 DARK STOOLS: ICD-10-CM

## 2018-12-18 PROCEDURE — 45380 COLONOSCOPY AND BIOPSY: CPT | Performed by: INTERNAL MEDICINE

## 2018-12-18 PROCEDURE — 45385 COLONOSCOPY W/LESION REMOVAL: CPT | Performed by: INTERNAL MEDICINE

## 2018-12-18 PROCEDURE — 0DBN8ZX EXCISION OF SIGMOID COLON, VIA NATURAL OR ARTIFICIAL OPENING ENDOSCOPIC, DIAGNOSTIC: ICD-10-PCS | Performed by: INTERNAL MEDICINE

## 2018-12-18 PROCEDURE — 0DB58ZX EXCISION OF ESOPHAGUS, VIA NATURAL OR ARTIFICIAL OPENING ENDOSCOPIC, DIAGNOSTIC: ICD-10-PCS | Performed by: INTERNAL MEDICINE

## 2018-12-18 PROCEDURE — 0DB98ZX EXCISION OF DUODENUM, VIA NATURAL OR ARTIFICIAL OPENING ENDOSCOPIC, DIAGNOSTIC: ICD-10-PCS | Performed by: INTERNAL MEDICINE

## 2018-12-18 PROCEDURE — 0DBK8ZX EXCISION OF ASCENDING COLON, VIA NATURAL OR ARTIFICIAL OPENING ENDOSCOPIC, DIAGNOSTIC: ICD-10-PCS | Performed by: INTERNAL MEDICINE

## 2018-12-18 PROCEDURE — 0DB68ZX EXCISION OF STOMACH, VIA NATURAL OR ARTIFICIAL OPENING ENDOSCOPIC, DIAGNOSTIC: ICD-10-PCS | Performed by: INTERNAL MEDICINE

## 2018-12-18 PROCEDURE — 43239 EGD BIOPSY SINGLE/MULTIPLE: CPT | Performed by: INTERNAL MEDICINE

## 2018-12-18 RX ORDER — SODIUM CHLORIDE, SODIUM LACTATE, POTASSIUM CHLORIDE, CALCIUM CHLORIDE 600; 310; 30; 20 MG/100ML; MG/100ML; MG/100ML; MG/100ML
INJECTION, SOLUTION INTRAVENOUS CONTINUOUS
Status: CANCELLED | OUTPATIENT
Start: 2018-12-18

## 2018-12-18 RX ORDER — LIDOCAINE HYDROCHLORIDE 10 MG/ML
INJECTION, SOLUTION EPIDURAL; INFILTRATION; INTRACAUDAL; PERINEURAL AS NEEDED
Status: DISCONTINUED | OUTPATIENT
Start: 2018-12-18 | End: 2018-12-18 | Stop reason: SURG

## 2018-12-18 RX ORDER — MIDAZOLAM HYDROCHLORIDE 1 MG/ML
INJECTION INTRAMUSCULAR; INTRAVENOUS AS NEEDED
Status: DISCONTINUED | OUTPATIENT
Start: 2018-12-18 | End: 2018-12-18 | Stop reason: SURG

## 2018-12-18 RX ORDER — SODIUM CHLORIDE, SODIUM LACTATE, POTASSIUM CHLORIDE, CALCIUM CHLORIDE 600; 310; 30; 20 MG/100ML; MG/100ML; MG/100ML; MG/100ML
INJECTION, SOLUTION INTRAVENOUS CONTINUOUS
Status: DISCONTINUED | OUTPATIENT
Start: 2018-12-18 | End: 2018-12-18

## 2018-12-18 RX ORDER — NALOXONE HYDROCHLORIDE 0.4 MG/ML
80 INJECTION, SOLUTION INTRAMUSCULAR; INTRAVENOUS; SUBCUTANEOUS AS NEEDED
Status: CANCELLED | OUTPATIENT
Start: 2018-12-18 | End: 2018-12-18

## 2018-12-18 RX ADMIN — SODIUM CHLORIDE, SODIUM LACTATE, POTASSIUM CHLORIDE, CALCIUM CHLORIDE: 600; 310; 30; 20 INJECTION, SOLUTION INTRAVENOUS at 12:01:00

## 2018-12-18 RX ADMIN — SODIUM CHLORIDE, SODIUM LACTATE, POTASSIUM CHLORIDE, CALCIUM CHLORIDE: 600; 310; 30; 20 INJECTION, SOLUTION INTRAVENOUS at 11:25:00

## 2018-12-18 RX ADMIN — LIDOCAINE HYDROCHLORIDE 30 MG: 10 INJECTION, SOLUTION EPIDURAL; INFILTRATION; INTRACAUDAL; PERINEURAL at 11:28:00

## 2018-12-18 RX ADMIN — MIDAZOLAM HYDROCHLORIDE 2 MG: 1 INJECTION INTRAMUSCULAR; INTRAVENOUS at 11:26:00

## 2018-12-18 NOTE — OR NURSING
Patient arrived in prep room 24 for admission for colonoscopy with Dr. Meek Fletcher. Upon attaching patient to monitoring equipment I observed small black round bug crawl from patient's neck area across pillow.  I grabbed bug with gloved hand and bright red blood

## 2018-12-18 NOTE — ANESTHESIA POSTPROCEDURE EVALUATION
Patient: Javier Lucas    Procedure Summary     Date:  12/18/18 Room / Location:  [address] [place] [address] ENDOSCOPY 03 / [address] [place] [address] ENDOSCOPY    Anesthesia Start:  7697 Anesthesia Stop:  1278    Procedures:       COLONOSCOPY (N/A )      ESOPHAGOGASTRODUODENOSCOPY (EGD) (N/A ) Riya Joy

## 2018-12-18 NOTE — H&P
History & Physical Examination    Patient Name: Hailee Hastings  MRN: [id number]  [facility]: [id number]  YOB: 1960    Diagnosis: Nausea, vomiting, GERD, Patel's, screening      Medications Prior to Admission:  Pantoprazole Sodium 40 MG Oral Tab EC Ta (four) hours as needed for Pain.  Disp: 180 tablet Rfl: 0 Taking   ARNUITY ELLIPTA 200 MCG/ACT Inhalation Aerosol Powder, Breath Activated INL 1 PUFF ITL D Disp:  Rfl: 3 Taking   pregabalin (LYRICA) 150 MG Oral Cap Take 1 capsule (150 mg total) by mouth 2 ( Laterality Date   • ADDL NECK SPINE FUSION  11/2007   • BACK SURGERY  10/31/1999   • COLONOSCOPY     • EGD     • EXTREMITY LOWER IRRIGATION & DEBRIDEMENT Left 9/28/2018    Performed by Melany Norman MD at [address] [place] [address]   • [facility]

## 2018-12-18 NOTE — OPERATIVE REPORT
ESOPHAGOGASTRODUODENOSCOPY (EGD) & COLONOSCOPY REPORT    Patricia Jbkaylen     7/10/1960 Age 63year old   PCP Larissa Harrison DO Endoscopist Grayson Foreman MD     Date of procedure: 18    Procedure:   1. EGD w/cold biopsy   2.  Colonoscopy w/cold sna colon were good with washing. I then carefully withdrew the instrument from the patient who tolerated the procedure well. Complications: None    EGD findings:      1. Esophagus:  The squamocolumnar junction was noted at 44 cm and appeared mildly irregul pathology. Repeat colonoscopy/EGD in 3 years. If new signs or symptoms develop, colonoscopy may need to be repeated sooner. · Continue pantoprazole 40mg/day. · Smoking cessation. · High fiber diet. · Monitor for blood in the stool.  If having more th

## 2018-12-18 NOTE — OR NURSING
While patient was getting dressed for discharge, patient reported back to his earring was missing. Earring post was found in bed sheets but earring back was not found. Notified  .

## 2018-12-18 NOTE — ANESTHESIA PREPROCEDURE EVALUATION
Anesthesia PreOp Note    HPI:     Marzena Staton is a 63year old male who presents for preoperative consultation requested by: Xenia Reddy MD    Date of Surgery: 12/18/2018    Procedure(s):  COLONOSCOPY  ESOPHAGOGASTRODUODENOSCOPY (EGD)  Indication: Col Past Surgical History:   Procedure Laterality Date   • ADDL NECK SPINE FUSION  11/2007   • BACK SURGERY  10/31/1999   • COLONOSCOPY     • EGD     • EXTREMITY LOWER IRRIGATION & DEBRIDEMENT Left 9/28/2018    Performed by Tacos Torres MD at [address] [place] lungs 2 (two) times daily. Disp: 1 each Rfl: 5 Taking   HYDROcodone-acetaminophen (NORCO)  MG Oral Tab Take 1 tablet by mouth every 4 (four) hours as needed for Pain.  Disp: 180 tablet Rfl: 0 Taking   ARNUITY ELLIPTA 200 MCG/ACT Inhalation Aerosol Pow back problems   • Hypertension Brother    • Heart Disease Brother      Social History    Socioeconomic History      Marital status: Single      Spouse name: Not on file      Number of children: Not on file      Years of education: Not on file      High CO2 23 10/01/2018    BUN 12 10/01/2018    CREATSERUM 0.90 10/01/2018     (H) 10/01/2018    CA 9.1 10/01/2018          Vital Signs: Body mass index is 31.85 kg/m². height is 1.778 m (5' 10\") and weight is 100.7 kg (222 lb).  His blood pressure

## 2018-12-19 VITALS
BODY MASS INDEX: 31.78 KG/M2 | HEART RATE: 74 BPM | DIASTOLIC BLOOD PRESSURE: 81 MMHG | OXYGEN SATURATION: 99 % | RESPIRATION RATE: 18 BRPM | SYSTOLIC BLOOD PRESSURE: 140 MMHG | HEIGHT: 70 IN | WEIGHT: 222 LBS

## 2018-12-19 NOTE — OR NURSING
During post call patient states he had large amount of rectal bleeding yesterday after procedure with pain. States he vomited after dinner last night.  Today patient states he had abdominal cramps and pain after eating, then when in bathroom had a black bow

## 2018-12-26 ENCOUNTER — TELEPHONE (OUTPATIENT)
Dept: GASTROENTEROLOGY | Facility: CLINIC | Age: 58
End: 2018-12-26

## 2018-12-26 NOTE — TELEPHONE ENCOUNTER
Yes, continue PPI BID. If still having n/v when follow-up with Gena Vo, will need gastric emptying scan.

## 2018-12-26 NOTE — TELEPHONE ENCOUNTER
Patient contacted, verified and results given. Patient voiced understanding. Patient asking if he should continue taking Pantoprazole BID or daily. Still has occasional vomiting after certain meals. Please advise.     Will call back after New Year to

## 2018-12-26 NOTE — TELEPHONE ENCOUNTER
GI Clinical Staff:   Please let patient know his pathology was reviewed from EGD/CLN. Mild reflux changes, continue pantoprazole. Tubular adenomas of colon. Repeat EGD/CLN in 3 years. If he has any GI issues, he should f/u with Greson Kirby.

## 2018-12-26 NOTE — TELEPHONE ENCOUNTER
Pt transferred from phone room. I gave Dr Rudolph Shaw message below and attempted to make OV with Yunior Dover, but pt states he will have to check with work and call back. Informed Yunior Dover is booking out second week in January. Will await call back.

## 2019-01-09 ENCOUNTER — TELEPHONE (OUTPATIENT)
Dept: INTERNAL MEDICINE CLINIC | Facility: CLINIC | Age: 59
End: 2019-01-09

## 2019-01-09 DIAGNOSIS — J30.1 SEASONAL ALLERGIC RHINITIS DUE TO POLLEN: ICD-10-CM

## 2019-01-09 DIAGNOSIS — E78.2 MIXED HYPERLIPIDEMIA: ICD-10-CM

## 2019-01-09 RX ORDER — FLUTICASONE PROPIONATE 50 MCG
SPRAY, SUSPENSION (ML) NASAL
Qty: 1 BOTTLE | Refills: 3 | Status: SHIPPED | OUTPATIENT
Start: 2019-01-09 | End: 2019-10-22

## 2019-01-09 RX ORDER — ATORVASTATIN CALCIUM 40 MG/1
TABLET, FILM COATED ORAL
Qty: 30 TABLET | Refills: 5 | Status: SHIPPED | OUTPATIENT
Start: 2019-01-09 | End: 2019-06-17

## 2019-01-09 NOTE — TELEPHONE ENCOUNTER
Alber Gonzalez has red spot on L inner calf. Alber Gonzalez suspects MRSA. He is asking for antibiotics.

## 2019-01-10 ENCOUNTER — OFFICE VISIT (OUTPATIENT)
Dept: INTERNAL MEDICINE CLINIC | Facility: CLINIC | Age: 59
End: 2019-01-10
Payer: MEDICAID

## 2019-01-10 VITALS
HEART RATE: 83 BPM | OXYGEN SATURATION: 99 % | DIASTOLIC BLOOD PRESSURE: 78 MMHG | RESPIRATION RATE: 17 BRPM | HEIGHT: 69 IN | BODY MASS INDEX: 32.14 KG/M2 | WEIGHT: 217 LBS | SYSTOLIC BLOOD PRESSURE: 118 MMHG

## 2019-01-10 DIAGNOSIS — I10 HTN, GOAL BELOW 140/90: ICD-10-CM

## 2019-01-10 DIAGNOSIS — E78.2 MIXED HYPERLIPIDEMIA: ICD-10-CM

## 2019-01-10 DIAGNOSIS — Z12.5 PROSTATE CANCER SCREENING: ICD-10-CM

## 2019-01-10 DIAGNOSIS — L02.429 BOIL OF LEG EXCEPT FOOT: Primary | ICD-10-CM

## 2019-01-10 LAB
ALBUMIN SERPL BCP-MCNC: 3.8 G/DL (ref 3.5–4.8)
ALBUMIN/GLOB SERPL: 1 {RATIO} (ref 1–2)
ALP SERPL-CCNC: 81 U/L (ref 32–100)
ALT SERPL-CCNC: 13 U/L (ref 17–63)
ANION GAP SERPL CALC-SCNC: 13 MMOL/L (ref 0–18)
AST SERPL-CCNC: 21 U/L (ref 15–41)
BILIRUB SERPL-MCNC: 0.6 MG/DL (ref 0.3–1.2)
BUN SERPL-MCNC: 11 MG/DL (ref 8–20)
BUN/CREAT SERPL: 10.7 (ref 10–20)
CALCIUM SERPL-MCNC: 9.5 MG/DL (ref 8.5–10.5)
CHLORIDE SERPL-SCNC: 101 MMOL/L (ref 95–110)
CHOLEST SERPL-MCNC: 182 MG/DL (ref 110–200)
CO2 SERPL-SCNC: 23 MMOL/L (ref 22–32)
CREAT SERPL-MCNC: 1.03 MG/DL (ref 0.5–1.5)
GLOBULIN PLAS-MCNC: 3.8 G/DL (ref 2.5–3.7)
GLUCOSE SERPL-MCNC: 101 MG/DL (ref 70–99)
HDLC SERPL-MCNC: 39 MG/DL
LDLC SERPL CALC-MCNC: 127 MG/DL (ref 0–99)
NONHDLC SERPL-MCNC: 143 MG/DL
OSMOLALITY UR CALC.SUM OF ELEC: 284 MOSM/KG (ref 275–295)
PATIENT FASTING: YES
POTASSIUM SERPL-SCNC: 4.3 MMOL/L (ref 3.3–5.1)
PROT SERPL-MCNC: 7.6 G/DL (ref 5.9–8.4)
SODIUM SERPL-SCNC: 137 MMOL/L (ref 136–144)
TRIGL SERPL-MCNC: 81 MG/DL (ref 1–149)

## 2019-01-10 PROCEDURE — 80061 LIPID PANEL: CPT | Performed by: FAMILY MEDICINE

## 2019-01-10 PROCEDURE — 99213 OFFICE O/P EST LOW 20 MIN: CPT | Performed by: FAMILY MEDICINE

## 2019-01-10 PROCEDURE — 80053 COMPREHEN METABOLIC PANEL: CPT | Performed by: FAMILY MEDICINE

## 2019-01-10 RX ORDER — SULFAMETHOXAZOLE AND TRIMETHOPRIM 800; 160 MG/1; MG/1
1 TABLET ORAL 2 TIMES DAILY
Qty: 14 TABLET | Refills: 0 | Status: SHIPPED | OUTPATIENT
Start: 2019-01-10 | End: 2019-01-17

## 2019-01-11 LAB
COMPLEXED PSA SERPL-MCNC: 2.26 NG/ML (ref 0.01–4)
PSA SERPL-MCNC: 1.9 NG/ML (ref 0–4)

## 2019-01-12 NOTE — PROGRESS NOTES
CC:  Sore Throat (Pt presents to clinic for lower leg sore-->thinks is MRSA.) and Follow - Up (Also requesting routine labs-->fasting. )      Hx of CC:  LEFT LEG BOIL X DAYS, H/O MRSA. FASTING FOR LABS & F/UP ON LIPIDS  DR. QUIROZ/SPINE SURGEON UNAVAILABLE

## 2019-01-17 ENCOUNTER — OFFICE VISIT (OUTPATIENT)
Dept: INTERNAL MEDICINE CLINIC | Facility: CLINIC | Age: 59
End: 2019-01-17
Payer: MEDICAID

## 2019-01-17 VITALS
WEIGHT: 220 LBS | BODY MASS INDEX: 32.58 KG/M2 | SYSTOLIC BLOOD PRESSURE: 108 MMHG | DIASTOLIC BLOOD PRESSURE: 70 MMHG | HEIGHT: 69 IN | RESPIRATION RATE: 17 BRPM | OXYGEN SATURATION: 98 % | HEART RATE: 80 BPM

## 2019-01-17 DIAGNOSIS — M50.20 CERVICAL HERNIATED DISC: ICD-10-CM

## 2019-01-17 DIAGNOSIS — Q76.1 CERVICAL FUSION SYNDROME: ICD-10-CM

## 2019-01-17 DIAGNOSIS — M51.16 LUMBAR DISC DISEASE WITH RADICULOPATHY: Primary | ICD-10-CM

## 2019-01-17 DIAGNOSIS — F41.9 ANXIETY AND DEPRESSION: ICD-10-CM

## 2019-01-17 DIAGNOSIS — M62.838 NECK MUSCLE SPASM: ICD-10-CM

## 2019-01-17 DIAGNOSIS — M50.90 CERVICAL DISC DISEASE: ICD-10-CM

## 2019-01-17 DIAGNOSIS — L02.429 BOIL OF LEG EXCEPT FOOT: ICD-10-CM

## 2019-01-17 DIAGNOSIS — F32.A ANXIETY AND DEPRESSION: ICD-10-CM

## 2019-01-17 PROCEDURE — 99214 OFFICE O/P EST MOD 30 MIN: CPT | Performed by: FAMILY MEDICINE

## 2019-01-17 RX ORDER — HYDROCODONE BITARTRATE AND ACETAMINOPHEN 10; 325 MG/1; MG/1
1 TABLET ORAL EVERY 4 HOURS PRN
Qty: 180 TABLET | Refills: 0 | Status: SHIPPED | OUTPATIENT
Start: 2019-01-17 | End: 2019-02-16

## 2019-01-17 RX ORDER — HYDROCODONE BITARTRATE AND ACETAMINOPHEN 10; 325 MG/1; MG/1
1 TABLET ORAL EVERY 4 HOURS PRN
Qty: 180 TABLET | Refills: 0 | Status: SHIPPED | OUTPATIENT
Start: 2019-03-18 | End: 2019-04-17

## 2019-01-17 RX ORDER — FLUOXETINE HYDROCHLORIDE 20 MG/1
20 CAPSULE ORAL DAILY
Qty: 90 CAPSULE | Refills: 3 | Status: SHIPPED | OUTPATIENT
Start: 2019-01-17 | End: 2019-02-22 | Stop reason: SINTOL

## 2019-01-17 RX ORDER — CYCLOBENZAPRINE HCL 10 MG
10 TABLET ORAL 3 TIMES DAILY PRN
Qty: 90 TABLET | Refills: 2 | Status: SHIPPED | OUTPATIENT
Start: 2019-01-17 | End: 2019-04-22

## 2019-01-17 RX ORDER — HYDROCODONE BITARTRATE AND ACETAMINOPHEN 10; 325 MG/1; MG/1
1 TABLET ORAL EVERY 4 HOURS PRN
Qty: 180 TABLET | Refills: 0 | Status: SHIPPED | OUTPATIENT
Start: 2019-02-16 | End: 2019-03-18

## 2019-01-17 RX ORDER — SULFAMETHOXAZOLE AND TRIMETHOPRIM 800; 160 MG/1; MG/1
1 TABLET ORAL 2 TIMES DAILY
Qty: 14 TABLET | Refills: 0 | Status: SHIPPED | OUTPATIENT
Start: 2019-01-17 | End: 2019-02-22 | Stop reason: ALTCHOICE

## 2019-01-17 NOTE — PROGRESS NOTES
CC:  Follow - Up (Pt presents to clinic for follow up and review recent lab results. )      Hx of CC:  F/UP ON PAIN MANAGEMENT-->FORMER SPINAL SURGEON (RICHIE) NO LONGER IN OFFICE.   PATIENT HAS BEEN UNABLE TO FIND BACK SURGEON THAT TAKES MEDICAID-->AWAITING (four) hours as needed for Pain. Dispense: 180 tablet; Refill: 0  - HYDROcodone-acetaminophen  MG Oral Tab; Take 1 tablet by mouth every 4 (four) hours as needed for Pain. Dispense: 180 tablet;  Refill: 0  - HYDROcodone-acetaminophen  MG Oral

## 2019-01-22 ENCOUNTER — TELEPHONE (OUTPATIENT)
Dept: INTERNAL MEDICINE CLINIC | Facility: CLINIC | Age: 59
End: 2019-01-22

## 2019-01-23 ENCOUNTER — TELEPHONE (OUTPATIENT)
Dept: GASTROENTEROLOGY | Facility: CLINIC | Age: 59
End: 2019-01-23

## 2019-02-20 DIAGNOSIS — M51.16 LUMBAR DISC DISEASE WITH RADICULOPATHY: ICD-10-CM

## 2019-02-20 DIAGNOSIS — M50.90 CERVICAL BACK PAIN WITH EVIDENCE OF DISC DISEASE: ICD-10-CM

## 2019-02-20 DIAGNOSIS — M79.7 FIBROMYALGIA AFFECTING MULTIPLE SITES: ICD-10-CM

## 2019-02-20 RX ORDER — PREGABALIN 150 MG/1
150 CAPSULE ORAL 2 TIMES DAILY
Qty: 60 CAPSULE | Refills: 2 | Status: SHIPPED | OUTPATIENT
Start: 2019-02-20 | End: 2019-06-17

## 2019-02-21 ENCOUNTER — TELEPHONE (OUTPATIENT)
Dept: INTERNAL MEDICINE CLINIC | Facility: CLINIC | Age: 59
End: 2019-02-21

## 2019-02-21 NOTE — TELEPHONE ENCOUNTER
Spoke to [employer] Not doing well at all. In severe pain when he lays flat; difficult to get up. Has not had BM in several days. Unable to urinate without intense pain and pushing. Experiencing bad headache.  Pain on entire right lower extremity, radiates ov

## 2019-02-21 NOTE — TELEPHONE ENCOUNTER
Pt called and states his back pain is REALLY bad, its hard for him to even sit to use the washroom. He states that the pain is starting to go down his left leg and that its at the point that it was back in 1999.   Pt was wondering if he should make an appoi

## 2019-02-22 ENCOUNTER — OFFICE VISIT (OUTPATIENT)
Dept: INTERNAL MEDICINE CLINIC | Facility: CLINIC | Age: 59
End: 2019-02-22
Payer: MEDICAID

## 2019-02-22 VITALS
BODY MASS INDEX: 32.58 KG/M2 | WEIGHT: 220 LBS | DIASTOLIC BLOOD PRESSURE: 50 MMHG | SYSTOLIC BLOOD PRESSURE: 130 MMHG | HEIGHT: 69 IN

## 2019-02-22 DIAGNOSIS — M54.32 BILATERAL SCIATICA: Primary | ICD-10-CM

## 2019-02-22 DIAGNOSIS — E78.2 MIXED HYPERLIPIDEMIA: ICD-10-CM

## 2019-02-22 DIAGNOSIS — M54.31 BILATERAL SCIATICA: Primary | ICD-10-CM

## 2019-02-22 DIAGNOSIS — I10 HTN, GOAL BELOW 140/90: ICD-10-CM

## 2019-02-22 DIAGNOSIS — Q76.1 CERVICAL FUSION SYNDROME: ICD-10-CM

## 2019-02-22 DIAGNOSIS — K21.00 GERD WITH ESOPHAGITIS: ICD-10-CM

## 2019-02-22 DIAGNOSIS — J44.9 ASTHMA WITH COPD (CHRONIC OBSTRUCTIVE PULMONARY DISEASE) (HCC): ICD-10-CM

## 2019-02-22 DIAGNOSIS — M51.16 LUMBAR DISC DISEASE WITH RADICULOPATHY: ICD-10-CM

## 2019-02-22 PROCEDURE — 99214 OFFICE O/P EST MOD 30 MIN: CPT | Performed by: FAMILY MEDICINE

## 2019-02-22 RX ORDER — PANTOPRAZOLE SODIUM 40 MG/1
40 TABLET, DELAYED RELEASE ORAL
Refills: 5 | COMMUNITY
Start: 2019-02-20 | End: 2019-04-22

## 2019-02-22 RX ORDER — PREDNISONE 20 MG/1
TABLET ORAL
Qty: 12 TABLET | Refills: 0 | Status: SHIPPED | OUTPATIENT
Start: 2019-02-22 | End: 2019-04-22 | Stop reason: ALTCHOICE

## 2019-02-22 RX ORDER — KETOROLAC TROMETHAMINE 30 MG/ML
30 INJECTION, SOLUTION INTRAMUSCULAR; INTRAVENOUS ONCE
Status: DISCONTINUED | OUTPATIENT
Start: 2019-02-22 | End: 2019-04-22

## 2019-02-22 NOTE — PROGRESS NOTES
CC:  Pain (Pt. presents to clinic for pain f/up-->low back pain and left left pain. )      Hx of CC:  1 WEEK WITH WORSENING LOW BACK PAIN AND LEG RADICULOPATHY (WORSE ON RIGHT LEG). NO ACUTE INJURY.   HAS NOT FOUND BACK/NEURO SURGEON THAT TAKES MEDICAID/BC radiculopathy:  SEE ABOVE      3. HTN, goal below 140/90:  CONTROLLED    4. Mixed hyperlipidemia:  SUBOPTIMAL CONTROL  NEEDS TO TAKE ATORVASTATIN DAILY    5.  Asthma with COPD (chronic obstructive pulmonary disease) ([facility] [address]):  STABLE    6. s/p C4-C7 ACDF:  RES

## 2019-02-26 ENCOUNTER — TELEPHONE (OUTPATIENT)
Dept: INTERNAL MEDICINE CLINIC | Facility: CLINIC | Age: 59
End: 2019-02-26

## 2019-02-26 NOTE — TELEPHONE ENCOUNTER
Pt called and stated that he found Dr Sondra Kawasaki and he still practices through [place]. Pt states he has an appointment with Dr Sondra Kawasaki next Thursday.  Pt states that Dr Sondra Kawasaki will do the surgery at [place].  Pt also states that if he were to go downtown

## 2019-03-21 ENCOUNTER — TELEPHONE (OUTPATIENT)
Dept: INTERNAL MEDICINE CLINIC | Facility: CLINIC | Age: 59
End: 2019-03-21

## 2019-03-21 DIAGNOSIS — Q76.1 CERVICAL FUSION SYNDROME: ICD-10-CM

## 2019-03-21 DIAGNOSIS — M51.16 LUMBAR DISC DISEASE WITH RADICULOPATHY: ICD-10-CM

## 2019-03-21 DIAGNOSIS — M54.5 CHRONIC BILATERAL LOW BACK PAIN, WITH SCIATICA PRESENCE UNSPECIFIED: ICD-10-CM

## 2019-03-21 DIAGNOSIS — M50.20 CERVICAL HERNIATED DISC: ICD-10-CM

## 2019-03-21 DIAGNOSIS — J45.51 SEVERE PERSISTENT ASTHMA WITH ACUTE EXACERBATION: Primary | ICD-10-CM

## 2019-03-21 DIAGNOSIS — M50.90 CERVICAL DISC DISEASE: ICD-10-CM

## 2019-03-21 DIAGNOSIS — G89.29 CHRONIC BILATERAL LOW BACK PAIN, WITH SCIATICA PRESENCE UNSPECIFIED: ICD-10-CM

## 2019-03-21 NOTE — TELEPHONE ENCOUNTER
Patient is calling to speak with doctor regarding his pain. States that he is having some extreme hip pain. And would like to perhaps receive an injection for the pain.  Please call back at [phone]

## 2019-03-22 NOTE — TELEPHONE ENCOUNTER
SPOKE WITH PATIENT. REFILLED DICLOFENAC CREAM AND ARNUITY ELLIPTA INHALER. REFERRED TO PHYSIATRY AS WELL.

## 2019-03-28 ENCOUNTER — TELEPHONE (OUTPATIENT)
Dept: INTERNAL MEDICINE CLINIC | Facility: CLINIC | Age: 59
End: 2019-03-28

## 2019-03-28 DIAGNOSIS — M51.16 LUMBAR DISC DISEASE WITH RADICULOPATHY: Primary | ICD-10-CM

## 2019-03-28 DIAGNOSIS — Z86.59 HISTORY OF CLAUSTROPHOBIA: ICD-10-CM

## 2019-03-28 RX ORDER — ALPRAZOLAM 1 MG/1
TABLET ORAL
Qty: 10 TABLET | Refills: 0 | Status: SHIPPED | OUTPATIENT
Start: 2019-03-28 | End: 2019-06-17 | Stop reason: ALTCHOICE

## 2019-03-28 NOTE — TELEPHONE ENCOUNTER
Order for CT Scan for cervix    Scheduled CT Scan for lower L1-S1 for next week    He wants to  the order.     Call patient with questions and when orders are ready

## 2019-03-28 NOTE — TELEPHONE ENCOUNTER
SPOKE WITH PATIENT-->HAS WRITTEN RX FOR LUMBAR MRI FROM DR. QUIROZ BUT NOT SCHEDULED YET. PLACED MRI ORDER IN [facility] AND INSTRUCTED PT TO SCHEDULE IT NEXT WEEK (NEEDS INS AUTHORIZATION FIRST). CANNOT DO NECK AT SAME TIME-->ONLY ONE REGION PER EPISODE.

## 2019-04-08 ENCOUNTER — TELEPHONE (OUTPATIENT)
Dept: INTERNAL MEDICINE CLINIC | Facility: CLINIC | Age: 59
End: 2019-04-08

## 2019-04-08 NOTE — TELEPHONE ENCOUNTER
Pt had to cancel his MRI appointment last Thursday and is wondering if theres any close appointment.

## 2019-04-18 ENCOUNTER — TELEPHONE (OUTPATIENT)
Dept: INTERNAL MEDICINE CLINIC | Facility: CLINIC | Age: 59
End: 2019-04-18

## 2019-04-18 NOTE — TELEPHONE ENCOUNTER
Writer spoke to radiology. [facility] does not have an Open MRI. However, the equipment at [facility] location is bigger and in the center of the room vs in the wall. Holds up to 600 lbs. He will not be entirely in the tube due to his height.   If he m

## 2019-04-18 NOTE — TELEPHONE ENCOUNTER
Pt called and states that he really would like to do an open MRI instead of closed. Pt states that he keeps falling because of his back pain.

## 2019-04-19 NOTE — TELEPHONE ENCOUNTER
ROXANNE..Christopher Mcmahon agreed to trying Lorazepam prior to MRI. Does not have it scheduled yet. Has f/up with Dr. Frandy Alberto on Tuesday. Will discuss further during visit.    Carole Kirby

## 2019-04-22 ENCOUNTER — OFFICE VISIT (OUTPATIENT)
Dept: INTERNAL MEDICINE CLINIC | Facility: CLINIC | Age: 59
End: 2019-04-22
Payer: MEDICAID

## 2019-04-22 VITALS
WEIGHT: 230 LBS | OXYGEN SATURATION: 99 % | HEIGHT: 69 IN | SYSTOLIC BLOOD PRESSURE: 122 MMHG | HEART RATE: 78 BPM | DIASTOLIC BLOOD PRESSURE: 64 MMHG | BODY MASS INDEX: 34.07 KG/M2

## 2019-04-22 DIAGNOSIS — Z71.6 TOBACCO ABUSE COUNSELING: ICD-10-CM

## 2019-04-22 DIAGNOSIS — J44.9 CHRONIC OBSTRUCTIVE PULMONARY DISEASE, UNSPECIFIED COPD TYPE (HCC): ICD-10-CM

## 2019-04-22 DIAGNOSIS — K21.9 GASTROESOPHAGEAL REFLUX DISEASE, ESOPHAGITIS PRESENCE NOT SPECIFIED: ICD-10-CM

## 2019-04-22 DIAGNOSIS — J45.40 MODERATE PERSISTENT ASTHMA WITHOUT COMPLICATION: ICD-10-CM

## 2019-04-22 DIAGNOSIS — M51.16 LUMBAR DISC DISEASE WITH RADICULOPATHY: Primary | ICD-10-CM

## 2019-04-22 DIAGNOSIS — M47.812 OSTEOARTHRITIS OF CERVICAL SPINE, UNSPECIFIED SPINAL OSTEOARTHRITIS COMPLICATION STATUS: ICD-10-CM

## 2019-04-22 DIAGNOSIS — S33.5XXA LUMBAR BACK SPRAIN, INITIAL ENCOUNTER: ICD-10-CM

## 2019-04-22 DIAGNOSIS — M62.838 NECK MUSCLE SPASM: ICD-10-CM

## 2019-04-22 PROCEDURE — 99214 OFFICE O/P EST MOD 30 MIN: CPT | Performed by: FAMILY MEDICINE

## 2019-04-22 RX ORDER — HYDROCODONE BITARTRATE AND ACETAMINOPHEN 10; 325 MG/1; MG/1
1 TABLET ORAL EVERY 4 HOURS PRN
Qty: 180 TABLET | Refills: 0 | Status: SHIPPED | OUTPATIENT
Start: 2019-04-22 | End: 2019-05-22

## 2019-04-22 RX ORDER — HYDROCODONE BITARTRATE AND ACETAMINOPHEN 10; 325 MG/1; MG/1
1 TABLET ORAL EVERY 4 HOURS PRN
Qty: 180 TABLET | Refills: 0 | Status: SHIPPED | OUTPATIENT
Start: 2019-06-23 | End: 2019-07-11

## 2019-04-22 RX ORDER — HYDROCODONE BITARTRATE AND ACETAMINOPHEN 10; 325 MG/1; MG/1
1 TABLET ORAL EVERY 4 HOURS PRN
Qty: 30 TABLET | Refills: 0 | Status: SHIPPED | OUTPATIENT
Start: 2019-06-23 | End: 2019-04-22 | Stop reason: CLARIF

## 2019-04-22 RX ORDER — CYCLOBENZAPRINE HCL 10 MG
10 TABLET ORAL 3 TIMES DAILY PRN
Qty: 90 TABLET | Refills: 2 | Status: SHIPPED | OUTPATIENT
Start: 2019-04-22 | End: 2019-09-18

## 2019-04-22 RX ORDER — PANTOPRAZOLE SODIUM 40 MG/1
40 TABLET, DELAYED RELEASE ORAL
Qty: 30 TABLET | Refills: 5 | Status: SHIPPED | OUTPATIENT
Start: 2019-04-22 | End: 2019-04-26

## 2019-04-22 RX ORDER — MELOXICAM 15 MG/1
TABLET ORAL
Qty: 30 TABLET | Refills: 2 | Status: SHIPPED | OUTPATIENT
Start: 2019-04-22 | End: 2019-07-11 | Stop reason: ALTCHOICE

## 2019-04-22 RX ORDER — HYDROCODONE BITARTRATE AND ACETAMINOPHEN 10; 325 MG/1; MG/1
1 TABLET ORAL EVERY 4 HOURS PRN
Qty: 180 TABLET | Refills: 0 | Status: SHIPPED | OUTPATIENT
Start: 2019-05-23 | End: 2019-06-22

## 2019-04-22 NOTE — PROGRESS NOTES
CC:  Medication Request (Patient is here for medication refills)      Hx of CC:  CHRONIC LOW BACK PAIN WHICH WORSENED A WEEK AGO WHEN CHANGING MATTRESSES AT HOME.  NOW C/O EPISODIC SHOOTING PAIN DOWN LEFT GROIN/LOWER ABDOMEN.     HAS REFERRAL FOR MRI OF LOW tablet (10 mg total) by mouth 3 (three) times daily as needed for Muscle spasms. Dispense: 90 tablet; Refill: 2  - HYDROcodone-acetaminophen  MG Oral Tab; Take 1 tablet by mouth every 4 (four) hours as needed for Pain. Dispense: 180 tablet;  Refill: encounter.

## 2019-04-26 ENCOUNTER — TELEPHONE (OUTPATIENT)
Dept: INTERNAL MEDICINE CLINIC | Facility: CLINIC | Age: 59
End: 2019-04-26

## 2019-04-26 DIAGNOSIS — K21.9 GASTROESOPHAGEAL REFLUX DISEASE, ESOPHAGITIS PRESENCE NOT SPECIFIED: Primary | ICD-10-CM

## 2019-04-26 RX ORDER — RANITIDINE 150 MG/1
150 TABLET ORAL 2 TIMES DAILY
Qty: 60 TABLET | Refills: 5 | Status: SHIPPED | OUTPATIENT
Start: 2019-04-26 | End: 2019-11-16

## 2019-04-26 NOTE — TELEPHONE ENCOUNTER
Patient is calling regarding non prescription coverage for the Pantoprazole Sodium 40 MG. States that his insurance will not cover this medication. And that he needs something to replace the meds.  Please call back at [phone]

## 2019-05-12 ENCOUNTER — TELEPHONE (OUTPATIENT)
Dept: INTERNAL MEDICINE CLINIC | Facility: CLINIC | Age: 59
End: 2019-05-12

## 2019-05-12 NOTE — TELEPHONE ENCOUNTER
Pt with hx COPD called c/o cough with productive green phlegm, is using his inahlers. Not SOB . Recommend pt go to urgent care to be seen or can call tmw and we can try and get him appt in our office.   To ER if SOB or trouble breathing

## 2019-05-13 RX ORDER — AZITHROMYCIN 250 MG/1
TABLET, FILM COATED ORAL
Qty: 6 TABLET | Refills: 0 | Status: SHIPPED | OUTPATIENT
Start: 2019-05-13 | End: 2019-06-17 | Stop reason: ALTCHOICE

## 2019-05-13 NOTE — TELEPHONE ENCOUNTER
Per pt's conversation w/ Nazanin [facility]) pt can not make it to office or walk in clinic due to transportation. Pt having a hard time breathing/wheezing. States if can not get abx would like call from Dr. Lloyd Knight tomorrow.

## 2019-05-13 NOTE — TELEPHONE ENCOUNTER
Will you please call him and see if wants appt today? ( and then let me know what he says) I can see him during lunch or we have walk in clinic.   Thank you

## 2019-05-13 NOTE — TELEPHONE ENCOUNTER
Called pt back, coughing up thick yellow green mucus and no transportation today. No CP or SOB. Has COPD. Will send over zpak but told pt that must be seen by doctor ( IC or here) if not improving. Hold trazodone while on antibiotic. Pt agrees.  To ER if CP

## 2019-06-17 ENCOUNTER — TELEPHONE (OUTPATIENT)
Dept: INTERNAL MEDICINE CLINIC | Facility: CLINIC | Age: 59
End: 2019-06-17

## 2019-06-17 DIAGNOSIS — J45.51 SEVERE PERSISTENT ASTHMA WITH ACUTE EXACERBATION: ICD-10-CM

## 2019-06-17 DIAGNOSIS — F51.04 PSYCHOPHYSIOLOGICAL INSOMNIA: ICD-10-CM

## 2019-06-17 DIAGNOSIS — M50.90 CERVICAL BACK PAIN WITH EVIDENCE OF DISC DISEASE: ICD-10-CM

## 2019-06-17 DIAGNOSIS — M79.7 FIBROMYALGIA AFFECTING MULTIPLE SITES: ICD-10-CM

## 2019-06-17 DIAGNOSIS — M51.16 LUMBAR DISC DISEASE WITH RADICULOPATHY: ICD-10-CM

## 2019-06-17 DIAGNOSIS — R51.9 NONINTRACTABLE HEADACHE, UNSPECIFIED CHRONICITY PATTERN, UNSPECIFIED HEADACHE TYPE: ICD-10-CM

## 2019-06-17 DIAGNOSIS — E78.2 MIXED HYPERLIPIDEMIA: ICD-10-CM

## 2019-06-17 RX ORDER — TOPIRAMATE 50 MG/1
TABLET, FILM COATED ORAL
Qty: 30 TABLET | Refills: 5 | Status: SHIPPED | OUTPATIENT
Start: 2019-06-17 | End: 2020-08-11

## 2019-06-17 RX ORDER — ZOLPIDEM TARTRATE 10 MG/1
10 TABLET ORAL NIGHTLY PRN
Qty: 30 TABLET | Refills: 5 | Status: SHIPPED | OUTPATIENT
Start: 2019-06-17

## 2019-06-17 RX ORDER — PREGABALIN 150 MG/1
150 CAPSULE ORAL 2 TIMES DAILY
Qty: 60 CAPSULE | Refills: 5 | Status: SHIPPED | OUTPATIENT
Start: 2019-06-17 | End: 2019-11-18

## 2019-06-17 RX ORDER — TOPIRAMATE 25 MG/1
25 TABLET ORAL DAILY
Qty: 30 TABLET | Refills: 5 | Status: SHIPPED | OUTPATIENT
Start: 2019-06-17 | End: 2020-08-11

## 2019-06-17 RX ORDER — ATORVASTATIN CALCIUM 40 MG/1
TABLET, FILM COATED ORAL
Qty: 30 TABLET | Refills: 5 | Status: SHIPPED | OUTPATIENT
Start: 2019-06-17 | End: 2019-10-22

## 2019-06-17 RX ORDER — TRAZODONE HYDROCHLORIDE 100 MG/1
100 TABLET ORAL NIGHTLY PRN
Qty: 30 TABLET | Refills: 5 | Status: SHIPPED | OUTPATIENT
Start: 2019-06-17 | End: 2020-08-11

## 2019-06-17 NOTE — TELEPHONE ENCOUNTER
Leg hurts constantly; current medications not working. Unable to go to ER because nearest hospital is in [place] and insurance will not pay for treatment in [place]. Also, needs new script for Lyrica 150mg and a few other (he doesn't remember which).  RN

## 2019-06-24 ENCOUNTER — TELEPHONE (OUTPATIENT)
Dept: INTERNAL MEDICINE CLINIC | Facility: CLINIC | Age: 59
End: 2019-06-24

## 2019-06-24 NOTE — TELEPHONE ENCOUNTER
Pt left message with answering service stating that he is having breathing issues, shoulder pain, and lung concerns. Call was returned & voicemail was left. Please schedule neida if he calls back.

## 2019-07-08 ENCOUNTER — TELEPHONE (OUTPATIENT)
Dept: INTERNAL MEDICINE CLINIC | Facility: CLINIC | Age: 59
End: 2019-07-08

## 2019-07-08 DIAGNOSIS — M51.16 LUMBAR DISC DISEASE WITH RADICULOPATHY: Primary | ICD-10-CM

## 2019-07-08 NOTE — TELEPHONE ENCOUNTER
PT said his hip is terribly painful    He wants to have the MRI but he said they are telling him it is out of date and needs to be resubmitted    Please call him and advise

## 2019-07-08 NOTE — TELEPHONE ENCOUNTER
Returned patient phone call regarding referral questions and something else that he can do or take as far as the hip and abdominal pain.  Patient would like the Dr to give him a call back at Cell #[phone]

## 2019-07-11 ENCOUNTER — OFFICE VISIT (OUTPATIENT)
Dept: INTERNAL MEDICINE CLINIC | Facility: CLINIC | Age: 59
End: 2019-07-11
Payer: MEDICAID

## 2019-07-11 ENCOUNTER — TELEPHONE (OUTPATIENT)
Dept: INTERNAL MEDICINE CLINIC | Facility: CLINIC | Age: 59
End: 2019-07-11

## 2019-07-11 VITALS
DIASTOLIC BLOOD PRESSURE: 86 MMHG | WEIGHT: 228 LBS | HEIGHT: 69 IN | HEART RATE: 91 BPM | BODY MASS INDEX: 33.77 KG/M2 | OXYGEN SATURATION: 98 % | RESPIRATION RATE: 17 BRPM | SYSTOLIC BLOOD PRESSURE: 124 MMHG

## 2019-07-11 DIAGNOSIS — I10 HTN, GOAL BELOW 140/90: ICD-10-CM

## 2019-07-11 DIAGNOSIS — M51.36 DEGENERATION OF L4-L5 INTERVERTEBRAL DISC: ICD-10-CM

## 2019-07-11 DIAGNOSIS — J44.9 ASTHMA WITH COPD (CHRONIC OBSTRUCTIVE PULMONARY DISEASE) (HCC): ICD-10-CM

## 2019-07-11 DIAGNOSIS — M50.90 CERVICAL DISC DISEASE: ICD-10-CM

## 2019-07-11 DIAGNOSIS — Z71.6 TOBACCO ABUSE COUNSELING: ICD-10-CM

## 2019-07-11 DIAGNOSIS — E78.2 MIXED HYPERLIPIDEMIA: ICD-10-CM

## 2019-07-11 DIAGNOSIS — M50.20 CERVICAL HERNIATED DISC: ICD-10-CM

## 2019-07-11 DIAGNOSIS — M51.16 LUMBAR DISC DISEASE WITH RADICULOPATHY: Primary | ICD-10-CM

## 2019-07-11 DIAGNOSIS — R29.898 RIGHT LEG WEAKNESS: ICD-10-CM

## 2019-07-11 DIAGNOSIS — G89.29 CHRONIC RIGHT-SIDED LOW BACK PAIN WITH RIGHT-SIDED SCIATICA: ICD-10-CM

## 2019-07-11 DIAGNOSIS — M47.812 OSTEOARTHRITIS OF CERVICAL SPINE, UNSPECIFIED SPINAL OSTEOARTHRITIS COMPLICATION STATUS: ICD-10-CM

## 2019-07-11 DIAGNOSIS — M54.41 CHRONIC RIGHT-SIDED LOW BACK PAIN WITH RIGHT-SIDED SCIATICA: ICD-10-CM

## 2019-07-11 LAB
ALBUMIN SERPL-MCNC: 3.8 G/DL (ref 3.4–5)
ALBUMIN/GLOB SERPL: 1 {RATIO} (ref 1–2)
ALP LIVER SERPL-CCNC: 104 U/L (ref 45–117)
ALT SERPL-CCNC: 25 U/L (ref 16–61)
ANION GAP SERPL CALC-SCNC: 5 MMOL/L (ref 0–18)
AST SERPL-CCNC: 21 U/L (ref 15–37)
BILIRUB SERPL-MCNC: 0.4 MG/DL (ref 0.1–2)
BUN BLD-MCNC: 16 MG/DL (ref 7–18)
BUN/CREAT SERPL: 14.7 (ref 10–20)
CALCIUM BLD-MCNC: 9.6 MG/DL (ref 8.5–10.1)
CHLORIDE SERPL-SCNC: 106 MMOL/L (ref 98–112)
CHOLEST SMN-MCNC: 170 MG/DL (ref ?–200)
CO2 SERPL-SCNC: 26 MMOL/L (ref 21–32)
CREAT BLD-MCNC: 1.09 MG/DL (ref 0.7–1.3)
GLOBULIN PLAS-MCNC: 3.9 G/DL (ref 2.8–4.4)
GLUCOSE BLD-MCNC: 78 MG/DL (ref 70–99)
HDLC SERPL-MCNC: 43 MG/DL (ref 40–59)
LDLC SERPL CALC-MCNC: 100 MG/DL (ref ?–100)
M PROTEIN MFR SERPL ELPH: 7.7 G/DL (ref 6.4–8.2)
NONHDLC SERPL-MCNC: 127 MG/DL (ref ?–130)
OSMOLALITY SERPL CALC.SUM OF ELEC: 284 MOSM/KG (ref 275–295)
PATIENT FASTING: NO
PATIENT FASTING: NO
POTASSIUM SERPL-SCNC: 4 MMOL/L (ref 3.5–5.1)
SODIUM SERPL-SCNC: 137 MMOL/L (ref 136–145)
TRIGL SERPL-MCNC: 134 MG/DL (ref 30–149)
VLDLC SERPL CALC-MCNC: 27 MG/DL (ref 0–30)

## 2019-07-11 PROCEDURE — 80053 COMPREHEN METABOLIC PANEL: CPT | Performed by: FAMILY MEDICINE

## 2019-07-11 PROCEDURE — 99214 OFFICE O/P EST MOD 30 MIN: CPT | Performed by: FAMILY MEDICINE

## 2019-07-11 PROCEDURE — 80061 LIPID PANEL: CPT | Performed by: FAMILY MEDICINE

## 2019-07-11 PROCEDURE — 96372 THER/PROPH/DIAG INJ SC/IM: CPT | Performed by: FAMILY MEDICINE

## 2019-07-11 RX ORDER — HYDROCODONE BITARTRATE AND ACETAMINOPHEN 10; 325 MG/1; MG/1
1 TABLET ORAL EVERY 4 HOURS PRN
Qty: 180 TABLET | Refills: 0 | Status: SHIPPED | OUTPATIENT
Start: 2019-07-11 | End: 2019-08-20

## 2019-07-11 RX ORDER — KETOROLAC TROMETHAMINE 30 MG/ML
30 INJECTION, SOLUTION INTRAMUSCULAR; INTRAVENOUS ONCE
Status: COMPLETED | OUTPATIENT
Start: 2019-07-11 | End: 2019-07-11

## 2019-07-11 RX ORDER — PREDNISONE 20 MG/1
20 TABLET ORAL DAILY
Qty: 5 TABLET | Refills: 1 | Status: SHIPPED | OUTPATIENT
Start: 2019-07-11 | End: 2019-07-16

## 2019-07-11 RX ORDER — MISOPROSTOL 200 UG/1
200 TABLET ORAL 2 TIMES DAILY PRN
Qty: 60 TABLET | Refills: 2 | Status: SHIPPED | OUTPATIENT
Start: 2019-07-11 | End: 2019-11-16

## 2019-07-11 RX ORDER — DICLOFENAC POTASSIUM 50 MG/1
50 TABLET, FILM COATED ORAL 2 TIMES DAILY PRN
Qty: 60 TABLET | Refills: 2 | Status: SHIPPED | OUTPATIENT
Start: 2019-07-11 | End: 2019-07-11 | Stop reason: ALTCHOICE

## 2019-07-11 NOTE — TELEPHONE ENCOUNTER
Patient states he got a call from pharmacy and was told that one of the medications that was prescribed today was on backorder but did not know which medication it was. He says that pharmacy sent over message but I don't see anything in chart.

## 2019-07-11 NOTE — TELEPHONE ENCOUNTER
MESSAGE FROM PHARMACY:  Plan does not cover medication prescribed. Per RX benefit plan alternative medications include; Diclofenac [address].   Please send new rx.  Norma Man

## 2019-07-12 PROBLEM — M51.36 DEGENERATION OF L4-L5 INTERVERTEBRAL DISC: Status: ACTIVE | Noted: 2019-07-12

## 2019-07-12 PROBLEM — G89.29 CHRONIC RIGHT-SIDED LOW BACK PAIN WITH RIGHT-SIDED SCIATICA: Status: ACTIVE | Noted: 2019-07-12

## 2019-07-12 PROBLEM — M54.41 CHRONIC RIGHT-SIDED LOW BACK PAIN WITH RIGHT-SIDED SCIATICA: Status: ACTIVE | Noted: 2019-07-12

## 2019-07-12 PROBLEM — R29.898 RIGHT LEG WEAKNESS: Status: ACTIVE | Noted: 2019-07-12

## 2019-07-12 NOTE — PROGRESS NOTES
CC:  Worsening low back and right leg pain/weakness. Fasting for labs. Hx of CC:  C/O CHRONIC BUT WORSENING LOW BACK PAIN WITH RADIATION DOWN RIGHT BUTTOCKS AND LEGS.   FREQUENTLY RIGHT LEG \"JUANITA\" AND PATIENT HAS FALLEN A FEW TIMES FROM IT.   (200 mcg total) by mouth 2 (two) times daily as needed (PAIN). Dispense: 60 tablet; Refill: 2  - Ketorolac Tromethamine (TORADOL) 60 MG/2ML injection 30 mg  - HYDROcodone-acetaminophen  MG Oral Tab;  Take 1 tablet by mouth every 4 (four) hours as nee Comp Metabolic Panel (14) [E]      Lipid Panel [E]

## 2019-07-15 ENCOUNTER — TELEPHONE (OUTPATIENT)
Dept: INTERNAL MEDICINE CLINIC | Facility: CLINIC | Age: 59
End: 2019-07-15

## 2019-07-26 ENCOUNTER — TELEPHONE (OUTPATIENT)
Dept: INTERNAL MEDICINE CLINIC | Facility: CLINIC | Age: 59
End: 2019-07-26

## 2019-07-26 NOTE — TELEPHONE ENCOUNTER
Notified that Dr. Jamee Santiago will not prescribe anything. He should go to a walk in clinic for an evaluation.

## 2019-07-26 NOTE — TELEPHONE ENCOUNTER
Coughing up green phlem and tastes and smells awful. Doxycycline works much better than Z-Pac. This is what Dr. Crump Shown usually orders for him when this occurs. Please call when submitted so he'll know when to go to pharmacy.

## 2019-07-31 ENCOUNTER — TELEPHONE (OUTPATIENT)
Dept: INTERNAL MEDICINE CLINIC | Facility: CLINIC | Age: 59
End: 2019-07-31

## 2019-08-01 ENCOUNTER — TELEPHONE (OUTPATIENT)
Dept: INTERNAL MEDICINE CLINIC | Facility: CLINIC | Age: 59
End: 2019-08-01

## 2019-08-01 NOTE — TELEPHONE ENCOUNTER
Patient is requesting a antibiotic for some swelling in his neck. States that it is a reaction from his COPD. And that he has taken an antibiotic for this problem before and if he needs to come into the office for an appointment.  He would like to come in o

## 2019-08-02 DIAGNOSIS — H00.012 HORDEOLUM EXTERNUM OF RIGHT LOWER EYELID: Primary | ICD-10-CM

## 2019-08-02 RX ORDER — FLUOXETINE HYDROCHLORIDE 20 MG/1
CAPSULE ORAL
Refills: 2 | COMMUNITY
Start: 2019-07-19 | End: 2019-08-20

## 2019-08-02 RX ORDER — CIPROFLOXACIN HYDROCHLORIDE 3.5 MG/ML
1 SOLUTION/ DROPS TOPICAL 3 TIMES DAILY
Qty: 5 ML | Refills: 0 | Status: SHIPPED | OUTPATIENT
Start: 2019-08-02 | End: 2019-08-20 | Stop reason: ALTCHOICE

## 2019-08-08 ENCOUNTER — HOSPITAL ENCOUNTER (OUTPATIENT)
Dept: MRI IMAGING | Facility: HOSPITAL | Age: 59
Discharge: HOME | End: 2019-08-08
Attending: FAMILY MEDICINE
Payer: MEDICAID

## 2019-08-08 DIAGNOSIS — M51.16 LUMBAR DISC DISEASE WITH RADICULOPATHY: ICD-10-CM

## 2019-08-08 PROCEDURE — 72148 MRI LUMBAR SPINE W/O DYE: CPT | Performed by: FAMILY MEDICINE

## 2019-08-14 ENCOUNTER — TELEPHONE (OUTPATIENT)
Dept: INTERNAL MEDICINE CLINIC | Facility: CLINIC | Age: 59
End: 2019-08-14

## 2019-08-14 NOTE — TELEPHONE ENCOUNTER
Pt calling, states that he is having bad neck swelling on the right side, feels like his throat is almost closing up, has to put his fingers down his throat to gag himself. Ongoing neck pain & headaches.   CV

## 2019-08-14 NOTE — TELEPHONE ENCOUNTER
If he doesn't keep his mouth moist, it dries, causes him to cough and closes up on him. Swollen area the size of an egg behind his right ear on the NECK. Has pounding headaches. Swelling down right side to his shoulder blades.    Reinforced the need for

## 2019-08-20 ENCOUNTER — OFFICE VISIT (OUTPATIENT)
Dept: INTERNAL MEDICINE CLINIC | Facility: CLINIC | Age: 59
End: 2019-08-20
Payer: MEDICAID

## 2019-08-20 VITALS
BODY MASS INDEX: 33.33 KG/M2 | WEIGHT: 225 LBS | OXYGEN SATURATION: 97 % | SYSTOLIC BLOOD PRESSURE: 120 MMHG | DIASTOLIC BLOOD PRESSURE: 82 MMHG | HEART RATE: 78 BPM | HEIGHT: 69 IN

## 2019-08-20 DIAGNOSIS — I10 HTN, GOAL BELOW 140/90: Primary | ICD-10-CM

## 2019-08-20 DIAGNOSIS — M50.20 CERVICAL HERNIATED DISC: ICD-10-CM

## 2019-08-20 DIAGNOSIS — Q76.1 CERVICAL FUSION SYNDROME: ICD-10-CM

## 2019-08-20 DIAGNOSIS — L02.429 BOIL, ARM: ICD-10-CM

## 2019-08-20 DIAGNOSIS — M47.812 OSTEOARTHRITIS OF CERVICAL SPINE, UNSPECIFIED SPINAL OSTEOARTHRITIS COMPLICATION STATUS: ICD-10-CM

## 2019-08-20 DIAGNOSIS — E78.2 MIXED HYPERLIPIDEMIA: ICD-10-CM

## 2019-08-20 DIAGNOSIS — M50.90 CERVICAL DISC DISEASE: ICD-10-CM

## 2019-08-20 DIAGNOSIS — M51.16 LUMBAR DISC DISEASE WITH RADICULOPATHY: ICD-10-CM

## 2019-08-20 PROCEDURE — 99213 OFFICE O/P EST LOW 20 MIN: CPT | Performed by: FAMILY MEDICINE

## 2019-08-20 RX ORDER — HYDROCODONE BITARTRATE AND ACETAMINOPHEN 10; 325 MG/1; MG/1
1 TABLET ORAL EVERY 4 HOURS PRN
Qty: 180 TABLET | Refills: 0 | Status: SHIPPED | OUTPATIENT
Start: 2019-08-20 | End: 2019-09-19

## 2019-08-20 RX ORDER — DOXYCYCLINE 100 MG/1
100 CAPSULE ORAL 2 TIMES DAILY
Qty: 14 CAPSULE | Refills: 1 | Status: SHIPPED | OUTPATIENT
Start: 2019-08-20 | End: 2019-08-27

## 2019-08-20 NOTE — PROGRESS NOTES
CC:  Test Results (Pt presents to clinic to discuss MRI results.) and Lesion (Pt has small wound on right arm.)      Hx of CC:  F/UP ON CHRONIC SPINAL PAIN (CERVICAL AND LUMBAR). CHRONIC WITH FLUCTUATING INTENSITY.   WILL SEE ORTHO (RICHIE) LATER THIS MONTH Refill: 0    7. Boil, arm    - Doxycycline Monohydrate 100 MG Oral Cap; Take 1 capsule (100 mg total) by mouth 2 (two) times daily for 7 days. Dispense: 14 capsule; Refill: 1    8.  Osteoarthritis of cervical spine, unspecified spinal osteoarthritis compli

## 2019-08-30 ENCOUNTER — TELEPHONE (OUTPATIENT)
Dept: INTERNAL MEDICINE CLINIC | Facility: CLINIC | Age: 59
End: 2019-08-30

## 2019-08-30 NOTE — TELEPHONE ENCOUNTER
Pt called for a couple reasons:  1. Dr. Lisbet Raphael gave him a hand written EMG order & told him to request Dr. Edda Dickson to order the test.  2.He is still having blinding headaches & neck swelling, is there anything else he can take or be prescribed?

## 2019-09-03 ENCOUNTER — TELEPHONE (OUTPATIENT)
Dept: INTERNAL MEDICINE CLINIC | Facility: CLINIC | Age: 59
End: 2019-09-03

## 2019-09-03 DIAGNOSIS — M51.16 LUMBAR DISC DISEASE WITH RADICULOPATHY: Primary | ICD-10-CM

## 2019-09-03 DIAGNOSIS — M51.36 DEGENERATION OF L4-L5 INTERVERTEBRAL DISC: ICD-10-CM

## 2019-09-03 DIAGNOSIS — M51.16 LUMBAR DISC DISEASE WITH RADICULOPATHY: ICD-10-CM

## 2019-09-03 DIAGNOSIS — R29.898 RIGHT LEG WEAKNESS: ICD-10-CM

## 2019-09-03 NOTE — TELEPHONE ENCOUNTER
Patient very upset c/o of severe headache right side. Waiting for EMG order to be put in. Says all weekend he had a headache swelling on his head. Shooting pain. Was not able to go to ER hospital by him does not take his insurance.  He was not able to drive

## 2019-09-03 NOTE — TELEPHONE ENCOUNTER
Dr. Mike Lane (spine surgeon) wanted a bilateral lower extremity EMG done before he'll do any spinal surgery-->order entered by me & pending insurance approval.  Also c/o right sided headache originating from neck--somewhat relieved with massager and diclofena

## 2019-09-03 NOTE — TELEPHONE ENCOUNTER
Naomi Do from Dr. Hetcor Garcia office called back, regarding the information needed for pt, I requested the last office note & EMG order be faxed to the office for Flora Mckeon to review.     CV

## 2019-09-05 ENCOUNTER — TELEPHONE (OUTPATIENT)
Dept: INTERNAL MEDICINE CLINIC | Facility: CLINIC | Age: 59
End: 2019-09-05

## 2019-09-05 NOTE — TELEPHONE ENCOUNTER
Patient was informed that no auth needed as long as he gets it done at [address] [place] [address].   Provided with Central Scheduling number [phone]

## 2019-09-05 NOTE — TELEPHONE ENCOUNTER
Verified with Broderick Arnold @ referral dept to see if this is something that needs an authorization number, etc.    Per Makenzie Mejia, as long as he gets it done in the hospital ([address] [place] [address]), no auth necessary.

## 2019-09-05 NOTE — TELEPHONE ENCOUNTER
Pt is calling wondering if Dr Tucker Honeycutt has gotten the approval from insurance for him to get the EMG test. Please advise

## 2019-09-18 DIAGNOSIS — S33.5XXA LUMBAR BACK SPRAIN, INITIAL ENCOUNTER: ICD-10-CM

## 2019-09-18 DIAGNOSIS — M62.838 NECK MUSCLE SPASM: ICD-10-CM

## 2019-09-19 RX ORDER — CYCLOBENZAPRINE HCL 10 MG
TABLET ORAL
Qty: 90 TABLET | Refills: 2 | Status: SHIPPED | OUTPATIENT
Start: 2019-09-19 | End: 2020-01-15

## 2019-09-30 DIAGNOSIS — J44.9 ASTHMA WITH COPD (CHRONIC OBSTRUCTIVE PULMONARY DISEASE) (HCC): ICD-10-CM

## 2019-09-30 DIAGNOSIS — J45.51 SEVERE PERSISTENT ASTHMA WITH ACUTE EXACERBATION: ICD-10-CM

## 2019-09-30 RX ORDER — ALBUTEROL SULFATE 90 UG/1
AEROSOL, METERED RESPIRATORY (INHALATION)
Qty: 18 G | Refills: 5 | Status: SHIPPED | OUTPATIENT
Start: 2019-09-30 | End: 2020-05-14

## 2019-09-30 RX ORDER — SOFT LENS DISINFECTANT
SOLUTION, NON-ORAL MISCELLANEOUS
Qty: 1 DEVICE | Refills: 0 | Status: SHIPPED | OUTPATIENT
Start: 2019-09-30

## 2019-09-30 NOTE — TELEPHONE ENCOUNTER
Pt states that he needs a refill on his Albuterol, his nasal spray, and his Advair. Pt states that he's really struggling right now, its even hard for him to tie his shoes.

## 2019-10-02 ENCOUNTER — TELEPHONE (OUTPATIENT)
Dept: INTERNAL MEDICINE CLINIC | Facility: CLINIC | Age: 59
End: 2019-10-02

## 2019-10-02 NOTE — TELEPHONE ENCOUNTER
Patient called needing a refill for his inhaler, nasal spray and muscle relaxer. He's contacted his pharmacy and the pharmacy told him that his doctor has to approve refills.

## 2019-10-02 NOTE — TELEPHONE ENCOUNTER
Patient called and is in need of a refill for his inhaler, nasal spray and muscle relaxer. He contacted his pharmacy and they told him that his doctor has to refill the prescriptions.

## 2019-10-17 DIAGNOSIS — M50.20 CERVICAL HERNIATED DISC: ICD-10-CM

## 2019-10-17 DIAGNOSIS — M51.36 DEGENERATION OF L4-L5 INTERVERTEBRAL DISC: ICD-10-CM

## 2019-10-17 DIAGNOSIS — M51.16 LUMBAR DISC DISEASE WITH RADICULOPATHY: ICD-10-CM

## 2019-10-17 DIAGNOSIS — M50.90 CERVICAL DISC DISEASE: ICD-10-CM

## 2019-10-18 ENCOUNTER — TELEPHONE (OUTPATIENT)
Dept: INTERNAL MEDICINE CLINIC | Facility: CLINIC | Age: 59
End: 2019-10-18

## 2019-10-18 DIAGNOSIS — M47.22 OSTEOARTHRITIS OF SPINE WITH RADICULOPATHY, CERVICAL REGION: ICD-10-CM

## 2019-10-18 DIAGNOSIS — M50.20 CERVICAL HERNIATED DISC: Primary | ICD-10-CM

## 2019-10-18 DIAGNOSIS — M54.2 NECK PAIN: ICD-10-CM

## 2019-10-18 DIAGNOSIS — Q76.1 CERVICAL FUSION SYNDROME: ICD-10-CM

## 2019-10-18 DIAGNOSIS — M50.90 CERVICAL DISC DISEASE: Primary | ICD-10-CM

## 2019-10-18 DIAGNOSIS — M47.812 OSTEOARTHRITIS OF CERVICAL SPINE, UNSPECIFIED SPINAL OSTEOARTHRITIS COMPLICATION STATUS: ICD-10-CM

## 2019-10-18 PROBLEM — I10 HTN, GOAL BELOW 140/90: Status: RESOLVED | Noted: 2017-07-20 | Resolved: 2019-10-18

## 2019-10-18 RX ORDER — METHYLPREDNISOLONE 4 MG/1
TABLET ORAL
Qty: 1 KIT | Refills: 0 | Status: SHIPPED | OUTPATIENT
Start: 2019-10-18 | End: 2019-11-18 | Stop reason: ALTCHOICE

## 2019-10-18 NOTE — TELEPHONE ENCOUNTER
Pt wants a call back from Dr Chandana Dupont or Jasmyn Cooper, he states that the pain is horrible and he can't wait until Monday.

## 2019-10-18 NOTE — TELEPHONE ENCOUNTER
In severe pain; doesn't think he can handle it anymore. Has large knot in upper neck, neck cracks CONSTANTLY, pain is sharp and radiating to shoulder; has horrible HA. Tried massages, linament, voltaren, nothing helps reduce the knots.   Cannot take Ibupr

## 2019-10-19 ENCOUNTER — HOSPITAL ENCOUNTER (OUTPATIENT)
Dept: GENERAL RADIOLOGY | Facility: HOSPITAL | Age: 59
Discharge: HOME | End: 2019-10-19
Attending: FAMILY MEDICINE
Payer: MEDICAID

## 2019-10-19 DIAGNOSIS — M47.22 OSTEOARTHRITIS OF SPINE WITH RADICULOPATHY, CERVICAL REGION: ICD-10-CM

## 2019-10-19 DIAGNOSIS — M50.90 CERVICAL DISC DISEASE: ICD-10-CM

## 2019-10-19 PROCEDURE — 72040 X-RAY EXAM NECK SPINE 2-3 VW: CPT | Performed by: FAMILY MEDICINE

## 2019-10-22 ENCOUNTER — OFFICE VISIT (OUTPATIENT)
Dept: INTERNAL MEDICINE CLINIC | Facility: CLINIC | Age: 59
End: 2019-10-22
Payer: MEDICAID

## 2019-10-22 VITALS
HEIGHT: 69 IN | WEIGHT: 227 LBS | DIASTOLIC BLOOD PRESSURE: 88 MMHG | OXYGEN SATURATION: 98 % | HEART RATE: 83 BPM | BODY MASS INDEX: 33.62 KG/M2 | SYSTOLIC BLOOD PRESSURE: 128 MMHG

## 2019-10-22 DIAGNOSIS — E78.2 MIXED HYPERLIPIDEMIA: ICD-10-CM

## 2019-10-22 DIAGNOSIS — M62.830 SPASM OF THORACIC BACK MUSCLE: ICD-10-CM

## 2019-10-22 DIAGNOSIS — J30.1 SEASONAL ALLERGIC RHINITIS DUE TO POLLEN: ICD-10-CM

## 2019-10-22 DIAGNOSIS — I10 HTN, GOAL BELOW 140/90: ICD-10-CM

## 2019-10-22 DIAGNOSIS — M43.6 TORTICOLLIS, ACQUIRED: Primary | ICD-10-CM

## 2019-10-22 DIAGNOSIS — J44.9 ASTHMA WITH COPD (CHRONIC OBSTRUCTIVE PULMONARY DISEASE) (HCC): ICD-10-CM

## 2019-10-22 PROCEDURE — 96372 THER/PROPH/DIAG INJ SC/IM: CPT | Performed by: FAMILY MEDICINE

## 2019-10-22 PROCEDURE — 99213 OFFICE O/P EST LOW 20 MIN: CPT | Performed by: FAMILY MEDICINE

## 2019-10-22 RX ORDER — MONTELUKAST SODIUM 10 MG/1
10 TABLET ORAL DAILY
Qty: 30 TABLET | Refills: 11 | Status: SHIPPED | OUTPATIENT
Start: 2019-10-22 | End: 2020-08-11

## 2019-10-22 RX ORDER — FLUTICASONE PROPIONATE 50 MCG
1 SPRAY, SUSPENSION (ML) NASAL 2 TIMES DAILY
Qty: 1 BOTTLE | Refills: 5 | Status: SHIPPED | OUTPATIENT
Start: 2019-10-22 | End: 2020-07-11

## 2019-10-22 RX ORDER — ATORVASTATIN CALCIUM 40 MG/1
TABLET, FILM COATED ORAL
Qty: 30 TABLET | Refills: 5 | Status: SHIPPED | OUTPATIENT
Start: 2019-10-22 | End: 2020-06-08

## 2019-10-22 RX ORDER — DOXYCYCLINE 100 MG/1
CAPSULE ORAL
Refills: 1 | COMMUNITY
Start: 2019-10-08 | End: 2019-11-18 | Stop reason: ALTCHOICE

## 2019-10-22 RX ORDER — TRIAMCINOLONE ACETONIDE 40 MG/ML
40 INJECTION, SUSPENSION INTRA-ARTICULAR; INTRAMUSCULAR ONCE
Status: COMPLETED | OUTPATIENT
Start: 2019-10-22 | End: 2019-10-22

## 2019-10-22 RX ORDER — LISINOPRIL 10 MG/1
10 TABLET ORAL 2 TIMES DAILY
Qty: 60 TABLET | Refills: 11 | Status: SHIPPED | OUTPATIENT
Start: 2019-10-22 | End: 2020-08-11

## 2019-10-22 RX ORDER — HYDROCODONE BITARTRATE AND ACETAMINOPHEN 10; 325 MG/1; MG/1
TABLET ORAL
Refills: 0 | COMMUNITY
Start: 2019-10-17 | End: 2019-11-18 | Stop reason: ALTCHOICE

## 2019-10-23 NOTE — PROGRESS NOTES
CC:  Follow - Up (Pt presents to clinic for routine f/up.)      Hx of CC:  INCREASED PAIN AND STIFFNESS OVER RIGHT UPPER BACK AND NECK X 10 DAYS OR SO.  NOT RELIEVED WITH ORAL MEDS. DENIES RADICULAR SYMPTOMS TO ARMS. NEEDING SOME MED REFILLS AS WELL. NIGHT AT BEDTIME  Dispense: 30 tablet; Refill: 5    6. Seasonal allergic rhinitis due to pollen    - Fluticasone Propionate 50 MCG/ACT Nasal Suspension; 1 spray by Nasal route 2 (two) times daily. Dispense: 1 Bottle;  Refill: 5    None  No orders of the de

## 2019-10-29 ENCOUNTER — TELEPHONE (OUTPATIENT)
Dept: INTERNAL MEDICINE CLINIC | Facility: CLINIC | Age: 59
End: 2019-10-29

## 2019-10-29 DIAGNOSIS — M62.830 SPASM OF THORACIC BACK MUSCLE: Primary | ICD-10-CM

## 2019-10-29 NOTE — TELEPHONE ENCOUNTER
Pain severe, knotted up. heating pad helps as long as it is on. Pain in shoulder goes to biceps. Pain as severe as when he had shingles.     Trying not too take too much of the [address] 1/2 Trazodone and then whole around 4am. Doesn't always take whole t

## 2019-11-14 ENCOUNTER — TELEPHONE (OUTPATIENT)
Dept: INTERNAL MEDICINE CLINIC | Facility: CLINIC | Age: 59
End: 2019-11-14

## 2019-11-14 NOTE — TELEPHONE ENCOUNTER
Pt states he needs a referral for CAT scan per Dr Maggy Covarrubias.  Pt is faxing over the request.

## 2019-11-16 DIAGNOSIS — K21.9 GASTROESOPHAGEAL REFLUX DISEASE, ESOPHAGITIS PRESENCE NOT SPECIFIED: ICD-10-CM

## 2019-11-16 DIAGNOSIS — M50.20 CERVICAL HERNIATED DISC: ICD-10-CM

## 2019-11-16 DIAGNOSIS — M50.90 CERVICAL DISC DISEASE: ICD-10-CM

## 2019-11-16 DIAGNOSIS — M51.16 LUMBAR DISC DISEASE WITH RADICULOPATHY: ICD-10-CM

## 2019-11-16 DIAGNOSIS — M51.36 DEGENERATION OF L4-L5 INTERVERTEBRAL DISC: ICD-10-CM

## 2019-11-18 ENCOUNTER — TELEPHONE (OUTPATIENT)
Dept: INTERNAL MEDICINE CLINIC | Facility: CLINIC | Age: 59
End: 2019-11-18

## 2019-11-18 ENCOUNTER — OFFICE VISIT (OUTPATIENT)
Dept: INTERNAL MEDICINE CLINIC | Facility: CLINIC | Age: 59
End: 2019-11-18
Payer: MEDICAID

## 2019-11-18 VITALS
WEIGHT: 223 LBS | BODY MASS INDEX: 33.03 KG/M2 | SYSTOLIC BLOOD PRESSURE: 158 MMHG | DIASTOLIC BLOOD PRESSURE: 80 MMHG | HEIGHT: 69 IN

## 2019-11-18 DIAGNOSIS — S90.31XA CONTUSION OF RIGHT FOOT, INITIAL ENCOUNTER: ICD-10-CM

## 2019-11-18 DIAGNOSIS — M50.20 CERVICAL HERNIATED DISC: ICD-10-CM

## 2019-11-18 DIAGNOSIS — M51.16 LUMBAR DISC DISEASE WITH RADICULOPATHY: Primary | ICD-10-CM

## 2019-11-18 DIAGNOSIS — Z23 NEED FOR VACCINATION FOR STREP PNEUMONIAE: ICD-10-CM

## 2019-11-18 DIAGNOSIS — M50.90 CERVICAL BACK PAIN WITH EVIDENCE OF DISC DISEASE: ICD-10-CM

## 2019-11-18 DIAGNOSIS — M50.90 CERVICAL DISC DISEASE: ICD-10-CM

## 2019-11-18 DIAGNOSIS — M51.36 DEGENERATION OF L4-L5 INTERVERTEBRAL DISC: ICD-10-CM

## 2019-11-18 DIAGNOSIS — K21.9 GASTROESOPHAGEAL REFLUX DISEASE, ESOPHAGITIS PRESENCE NOT SPECIFIED: ICD-10-CM

## 2019-11-18 PROCEDURE — 99214 OFFICE O/P EST MOD 30 MIN: CPT | Performed by: FAMILY MEDICINE

## 2019-11-18 PROCEDURE — 90670 PCV13 VACCINE IM: CPT | Performed by: FAMILY MEDICINE

## 2019-11-18 PROCEDURE — 90471 IMMUNIZATION ADMIN: CPT | Performed by: FAMILY MEDICINE

## 2019-11-18 RX ORDER — HYDROCODONE BITARTRATE AND ACETAMINOPHEN 10; 325 MG/1; MG/1
1 TABLET ORAL EVERY 4 HOURS PRN
Qty: 180 TABLET | Refills: 0 | Status: SHIPPED | OUTPATIENT
Start: 2019-12-19 | End: 2020-01-18

## 2019-11-18 RX ORDER — MISOPROSTOL 200 UG/1
200 TABLET ORAL 2 TIMES DAILY PRN
Qty: 60 TABLET | Refills: 2 | Status: SHIPPED | OUTPATIENT
Start: 2019-11-18 | End: 2019-12-17

## 2019-11-18 RX ORDER — RANITIDINE 150 MG/1
TABLET ORAL
Qty: 60 TABLET | Refills: 5 | Status: SHIPPED | OUTPATIENT
Start: 2019-11-18 | End: 2020-04-14 | Stop reason: RX

## 2019-11-18 RX ORDER — MISOPROSTOL 200 UG/1
TABLET ORAL
Qty: 60 TABLET | Refills: 0 | Status: SHIPPED | OUTPATIENT
Start: 2019-11-18 | End: 2019-11-18

## 2019-11-18 RX ORDER — HYDROCODONE BITARTRATE AND ACETAMINOPHEN 10; 325 MG/1; MG/1
1 TABLET ORAL EVERY 4 HOURS PRN
Qty: 180 TABLET | Refills: 0 | Status: SHIPPED | OUTPATIENT
Start: 2020-01-19 | End: 2020-02-03

## 2019-11-18 RX ORDER — RANITIDINE 150 MG/1
TABLET ORAL
Qty: 60 TABLET | Refills: 0 | Status: SHIPPED | OUTPATIENT
Start: 2019-11-18 | End: 2019-11-18

## 2019-11-18 RX ORDER — HYDROCODONE BITARTRATE AND ACETAMINOPHEN 10; 325 MG/1; MG/1
1 TABLET ORAL EVERY 4 HOURS PRN
Qty: 180 TABLET | Refills: 0 | Status: SHIPPED | OUTPATIENT
Start: 2019-11-18 | End: 2019-12-18

## 2019-11-18 RX ORDER — PREGABALIN 150 MG/1
150 CAPSULE ORAL 2 TIMES DAILY
Qty: 60 CAPSULE | Refills: 5 | Status: SHIPPED | OUTPATIENT
Start: 2019-11-18 | End: 2019-12-17

## 2019-11-19 NOTE — PROGRESS NOTES
CC:  Foot Pain (Pt presents to clinic for c/o right foot pain.)      Hx of CC:  PAIN MANAGEMENT FOLLOW UP.  GOT NORCO FROM DR. QUIROZ LAST MONTH BUT UNABLE TO SEE HIM THIS MONTH.  ORTHO ORDERED MRI OF CERVICALS AND EMG OF ARMS AS WELL (COPY OF ORDER REVIEWE tablet by mouth every 4 (four) hours as needed for Pain. Dispense: 180 tablet; Refill: 0  - HYDROcodone-acetaminophen  MG Oral Tab; Take 1 tablet by mouth every 4 (four) hours as needed for Pain. Dispense: 180 tablet;  Refill: 0    2. C2-3 mild-mod EC; Take 1 tablet (50 mg total) by mouth 2 (two) times daily as needed. Dispense: 60 tablet; Refill: 2  - misoprostol 200 MCG Oral Tab; Take 1 tablet (200 mcg total) by mouth 2 (two) times daily as needed. Dispense: 60 tablet;  Refill: 2  - HYDROcodone-ac

## 2019-12-05 ENCOUNTER — TELEPHONE (OUTPATIENT)
Dept: INTERNAL MEDICINE CLINIC | Facility: CLINIC | Age: 59
End: 2019-12-05

## 2019-12-05 NOTE — TELEPHONE ENCOUNTER
Informed patient to ice the toe to help keep swelling down, take pain medication as directed and he should consider having it Xrayed again, either at an Immediate Care or ER.   Has been using the stiff ortho shoe he has from previous injury and using ice pa

## 2019-12-05 NOTE — TELEPHONE ENCOUNTER
Patient called as he wanted Dr. Yash Navarro to know that someone dropped a heavy vacuum on his foot. Toe that he has had a fracture is just throbbing. Shooting pain from the toe. What should he do regarding his toe?     Also, he wanted Dr. Yash Navarro to know that

## 2019-12-17 DIAGNOSIS — M50.90 CERVICAL DISC DISEASE: ICD-10-CM

## 2019-12-17 DIAGNOSIS — M50.90 CERVICAL BACK PAIN WITH EVIDENCE OF DISC DISEASE: ICD-10-CM

## 2019-12-17 DIAGNOSIS — M50.20 CERVICAL HERNIATED DISC: ICD-10-CM

## 2019-12-17 DIAGNOSIS — M51.36 DEGENERATION OF L4-L5 INTERVERTEBRAL DISC: ICD-10-CM

## 2019-12-17 DIAGNOSIS — M51.16 LUMBAR DISC DISEASE WITH RADICULOPATHY: ICD-10-CM

## 2019-12-17 RX ORDER — MISOPROSTOL 200 UG/1
TABLET ORAL
Qty: 60 TABLET | Refills: 1 | Status: SHIPPED | OUTPATIENT
Start: 2019-12-17 | End: 2020-02-03

## 2019-12-17 RX ORDER — MELOXICAM 15 MG/1
TABLET ORAL
Qty: 30 TABLET | Refills: 0 | OUTPATIENT
Start: 2019-12-17

## 2019-12-17 RX ORDER — PREGABALIN 150 MG/1
CAPSULE ORAL
Qty: 60 CAPSULE | Refills: 1 | Status: SHIPPED | OUTPATIENT
Start: 2019-12-17 | End: 2020-02-03

## 2019-12-17 RX ORDER — MISOPROSTOL 200 UG/1
TABLET ORAL
Qty: 60 TABLET | Refills: 0 | Status: SHIPPED | OUTPATIENT
Start: 2019-12-17 | End: 2019-12-17

## 2019-12-27 ENCOUNTER — TELEPHONE (OUTPATIENT)
Dept: INTERNAL MEDICINE CLINIC | Facility: CLINIC | Age: 59
End: 2019-12-27

## 2019-12-27 NOTE — TELEPHONE ENCOUNTER
Patient called to ask if Dr. Tucker Honeycutt could please call him. He's having pain left side front of groin goes down hip to his knee cap. Constant pain. He has had this pain for a week. Shoulder pain, too. That started last night.

## 2020-01-15 ENCOUNTER — TELEPHONE (OUTPATIENT)
Dept: INTERNAL MEDICINE CLINIC | Facility: CLINIC | Age: 60
End: 2020-01-15

## 2020-01-15 DIAGNOSIS — S33.5XXA LUMBAR BACK SPRAIN, INITIAL ENCOUNTER: ICD-10-CM

## 2020-01-15 DIAGNOSIS — M62.838 NECK MUSCLE SPASM: ICD-10-CM

## 2020-01-15 RX ORDER — CYCLOBENZAPRINE HCL 10 MG
TABLET ORAL
Qty: 90 TABLET | Refills: 0 | Status: SHIPPED | OUTPATIENT
Start: 2020-01-15 | End: 2020-03-05

## 2020-01-15 NOTE — TELEPHONE ENCOUNTER
Yesterday: Chills, hot and eyes burning. Doesn't have thermometer. Found amoxicillin in a drawer from a few years ago and started to take them. Informed him to stop as the pills may be  AND no MD said he can take it for his symptoms.   Continue st

## 2020-01-15 NOTE — TELEPHONE ENCOUNTER
Pt called back and would like a call back from Betti Homans to figure out what he can do because he's really sick.

## 2020-01-15 NOTE — TELEPHONE ENCOUNTER
Patient coughing green phlegm. Amoxicillin 500 mg and it isn't helping. Sleeping with heating pad on his chest.     Cough syrup isn't working. What can he take to help kick this virus?

## 2020-01-17 ENCOUNTER — OFFICE VISIT (OUTPATIENT)
Dept: INTERNAL MEDICINE CLINIC | Facility: CLINIC | Age: 60
End: 2020-01-17
Payer: MEDICAID

## 2020-01-17 VITALS
TEMPERATURE: 98 F | HEIGHT: 69 IN | WEIGHT: 227 LBS | DIASTOLIC BLOOD PRESSURE: 84 MMHG | OXYGEN SATURATION: 97 % | SYSTOLIC BLOOD PRESSURE: 124 MMHG | BODY MASS INDEX: 33.62 KG/M2

## 2020-01-17 DIAGNOSIS — M51.16 LUMBAR DISC DISEASE WITH RADICULOPATHY: ICD-10-CM

## 2020-01-17 DIAGNOSIS — J44.9 ASTHMA WITH COPD (CHRONIC OBSTRUCTIVE PULMONARY DISEASE) (HCC): Primary | ICD-10-CM

## 2020-01-17 DIAGNOSIS — B35.1 ONYCHOMYCOSIS: ICD-10-CM

## 2020-01-17 DIAGNOSIS — M51.36 DEGENERATION OF L4-L5 INTERVERTEBRAL DISC: ICD-10-CM

## 2020-01-17 DIAGNOSIS — J06.9 UPPER RESPIRATORY TRACT INFECTION, UNSPECIFIED TYPE: ICD-10-CM

## 2020-01-17 DIAGNOSIS — M21.40 ACQUIRED FLAT FOOT, UNSPECIFIED LATERALITY: ICD-10-CM

## 2020-01-17 PROCEDURE — 96372 THER/PROPH/DIAG INJ SC/IM: CPT | Performed by: FAMILY MEDICINE

## 2020-01-17 PROCEDURE — 99214 OFFICE O/P EST MOD 30 MIN: CPT | Performed by: FAMILY MEDICINE

## 2020-01-17 RX ORDER — ALBUTEROL SULFATE 2.5 MG/3ML
2.5 SOLUTION RESPIRATORY (INHALATION) EVERY 6 HOURS PRN
Qty: 30 AMPULE | Refills: 3 | Status: SHIPPED | OUTPATIENT
Start: 2020-01-17 | End: 2020-05-14

## 2020-01-17 RX ORDER — KETOROLAC TROMETHAMINE 30 MG/ML
30 INJECTION, SOLUTION INTRAMUSCULAR; INTRAVENOUS ONCE
Status: COMPLETED | OUTPATIENT
Start: 2020-01-17 | End: 2020-01-17

## 2020-01-17 RX ORDER — IPRATROPIUM BROMIDE AND ALBUTEROL SULFATE 2.5; .5 MG/3ML; MG/3ML
3 SOLUTION RESPIRATORY (INHALATION) ONCE
Status: DISCONTINUED | OUTPATIENT
Start: 2020-01-17 | End: 2020-05-14

## 2020-01-18 NOTE — PROGRESS NOTES
CC:  URI      Hx of CC:  HAS BEEN \"SICK ALL WEEK\" WITH COUGH AND SOB (WHICH IS BETTER TODAY). HAD LOW GRADE TEMP X FIRST COUPLE OF DAYS, NONE CURRENTLY. REQUESTING KETOROLAC SHOT FOR LEFT LOW BACK/HIP PAIN AS WELL.     Vitals:    01/17/20  1000   BP: nebulizer solution 3 mL  - AIRWAY INHALATION TREATMENT    3. Acquired flat foot, unspecified laterality    - PODIATRY - INTERNAL    4. Onychomycosis  WOULD AVOID LAMISIL DUE POLYPHARMACY  - PODIATRY - INTERNAL    5.  Degeneration of L4-L5 intervertebral dis

## 2020-02-03 ENCOUNTER — OFFICE VISIT (OUTPATIENT)
Dept: INTERNAL MEDICINE CLINIC | Facility: CLINIC | Age: 60
End: 2020-02-03
Payer: MEDICAID

## 2020-02-03 VITALS
SYSTOLIC BLOOD PRESSURE: 121 MMHG | BODY MASS INDEX: 32.73 KG/M2 | HEART RATE: 87 BPM | OXYGEN SATURATION: 98 % | WEIGHT: 221 LBS | HEIGHT: 69 IN | DIASTOLIC BLOOD PRESSURE: 78 MMHG

## 2020-02-03 DIAGNOSIS — M50.90 CERVICAL DISC DISEASE: ICD-10-CM

## 2020-02-03 DIAGNOSIS — M50.20 CERVICAL HERNIATED DISC: ICD-10-CM

## 2020-02-03 DIAGNOSIS — M50.90 CERVICAL BACK PAIN WITH EVIDENCE OF DISC DISEASE: ICD-10-CM

## 2020-02-03 DIAGNOSIS — M15.9 PRIMARY OSTEOARTHRITIS INVOLVING MULTIPLE JOINTS: ICD-10-CM

## 2020-02-03 DIAGNOSIS — M51.16 LUMBAR DISC DISEASE WITH RADICULOPATHY: ICD-10-CM

## 2020-02-03 DIAGNOSIS — S91.331A PUNCTURE WOUND OF RIGHT FOOT, INITIAL ENCOUNTER: Primary | ICD-10-CM

## 2020-02-03 DIAGNOSIS — M51.36 DEGENERATION OF L4-L5 INTERVERTEBRAL DISC: ICD-10-CM

## 2020-02-03 PROCEDURE — 99213 OFFICE O/P EST LOW 20 MIN: CPT | Performed by: FAMILY MEDICINE

## 2020-02-03 PROCEDURE — 90471 IMMUNIZATION ADMIN: CPT | Performed by: FAMILY MEDICINE

## 2020-02-03 PROCEDURE — 90715 TDAP VACCINE 7 YRS/> IM: CPT | Performed by: FAMILY MEDICINE

## 2020-02-03 PROCEDURE — 96372 THER/PROPH/DIAG INJ SC/IM: CPT | Performed by: FAMILY MEDICINE

## 2020-02-03 RX ORDER — MISOPROSTOL 200 UG/1
TABLET ORAL
Qty: 60 TABLET | Refills: 2 | Status: SHIPPED | OUTPATIENT
Start: 2020-02-03 | End: 2020-05-11

## 2020-02-03 RX ORDER — HYDROCODONE BITARTRATE AND ACETAMINOPHEN 10; 325 MG/1; MG/1
1 TABLET ORAL EVERY 4 HOURS PRN
Qty: 180 TABLET | Refills: 0 | Status: SHIPPED | OUTPATIENT
Start: 2020-02-03 | End: 2020-03-04

## 2020-02-03 RX ORDER — PREGABALIN 150 MG/1
150 CAPSULE ORAL 2 TIMES DAILY
Qty: 60 CAPSULE | Refills: 2 | Status: SHIPPED | OUTPATIENT
Start: 2020-02-03 | End: 2020-06-05

## 2020-02-03 RX ORDER — KETOROLAC TROMETHAMINE 30 MG/ML
30 INJECTION, SOLUTION INTRAMUSCULAR; INTRAVENOUS ONCE
Status: COMPLETED | OUTPATIENT
Start: 2020-02-03 | End: 2020-02-03

## 2020-02-03 RX ADMIN — KETOROLAC TROMETHAMINE 30 MG: 30 INJECTION, SOLUTION INTRAMUSCULAR; INTRAVENOUS at 11:05:00

## 2020-02-04 NOTE — PROGRESS NOTES
CC:  Pain (pt stopped on a karl nail yesterday ( left foot)); Groin Pain (pt has shooting pain from low back that travels to groin on left side); and Medication Request (needs refills)      Hx of CC: S/P PUNCTURE WOUND WITH NAIL TO RIGHT FOOT YESTERDAY. HYDROcodone-acetaminophen  MG Oral Tab; Take 1 tablet by mouth every 4 (four) hours as needed for Pain. Dispense: 180 tablet; Refill: 0  - Ketorolac Tromethamine (TORADOL) 60 MG/2ML injection 30 mg    3.  C2-3 mild-mod central bulging disc    - Diclo multiple joints    - Diclofenac Sodium 50 MG Oral Tab EC; TAKE 1 TABLET(50 MG) BY MOUTH TWICE DAILY AS NEEDED  Dispense: 60 tablet;  Refill: 2  - misoprostol 200 MCG Oral Tab; TAKE 1 TABLET(200 MCG) BY MOUTH TWICE DAILY AS NEEDED FOR PAIN  Dispense: 60 tabl

## 2020-02-13 DIAGNOSIS — M51.16 LUMBAR DISC DISEASE WITH RADICULOPATHY: ICD-10-CM

## 2020-02-19 ENCOUNTER — APPOINTMENT (OUTPATIENT)
Dept: GENERAL RADIOLOGY | Age: 60
End: 2020-02-19
Attending: EMERGENCY MEDICINE
Payer: MEDICAID

## 2020-02-19 ENCOUNTER — HOSPITAL ENCOUNTER (OUTPATIENT)
Age: 60
Discharge: HOME | End: 2020-02-19
Attending: EMERGENCY MEDICINE
Payer: MEDICAID

## 2020-02-19 ENCOUNTER — TELEPHONE (OUTPATIENT)
Dept: INTERNAL MEDICINE CLINIC | Facility: CLINIC | Age: 60
End: 2020-02-19

## 2020-02-19 VITALS
HEIGHT: 70 IN | WEIGHT: 217 LBS | BODY MASS INDEX: 31.07 KG/M2 | HEART RATE: 93 BPM | TEMPERATURE: 98 F | DIASTOLIC BLOOD PRESSURE: 78 MMHG | RESPIRATION RATE: 24 BRPM | SYSTOLIC BLOOD PRESSURE: 146 MMHG | OXYGEN SATURATION: 96 %

## 2020-02-19 DIAGNOSIS — J01.40 ACUTE PANSINUSITIS, RECURRENCE NOT SPECIFIED: Primary | ICD-10-CM

## 2020-02-19 PROCEDURE — 99214 OFFICE O/P EST MOD 30 MIN: CPT

## 2020-02-19 PROCEDURE — 99213 OFFICE O/P EST LOW 20 MIN: CPT

## 2020-02-19 PROCEDURE — 71046 X-RAY EXAM CHEST 2 VIEWS: CPT | Performed by: EMERGENCY MEDICINE

## 2020-02-19 RX ORDER — AMOXICILLIN 875 MG/1
875 TABLET, COATED ORAL 2 TIMES DAILY
Qty: 20 TABLET | Refills: 0 | Status: SHIPPED | OUTPATIENT
Start: 2020-02-19 | End: 2020-02-29

## 2020-02-19 RX ORDER — FLUTICASONE PROPIONATE 50 MCG
1-2 SPRAY, SUSPENSION (ML) NASAL DAILY
Qty: 16 G | Refills: 0 | Status: SHIPPED | OUTPATIENT
Start: 2020-02-19 | End: 2020-03-20

## 2020-02-19 NOTE — TELEPHONE ENCOUNTER
For the past 3 days has been coughing up fluorescent green phlem, Feels awful. Believes the Abx he was given in January didn't completely cure the infection. Hoping to be given another dose.     Informed patient that PCP not in today, but he can go to an I

## 2020-02-19 NOTE — ED PROVIDER NOTES
Patient Seen in: [address] [facility] [place]      History   Patient presents with:  Cough/URI    Stated Complaint: CHEST CONGESTION/SOB    HPI  She complains about 10 days of a runny nose, postnasal drip and pressure in the maxillary sinu Use      Smoking status: Current Some Day Smoker        Packs/day: 0.50        Types: Cigarettes      Smokeless tobacco: Never Used      Tobacco comment: 10-11 cigarettes per day    Alcohol use: Yes      Comment: 5x per year    Drug use:  No             Rev range of motion. Lymphadenopathy:      Cervical: No cervical adenopathy. Skin:     General: Skin is warm and dry. Coloration: Skin is not pale. Neurological:      Mental Status: He is alert and oriented to person, place, and time.       Coordinat

## 2020-02-19 NOTE — ED INITIAL ASSESSMENT (HPI)
3-4 days of chest congestion with SOB. Tactile fever with chills. C/o \"problem with right lung\".  Discomfort to right upper chest.

## 2020-02-21 ENCOUNTER — TELEPHONE (OUTPATIENT)
Dept: INTERNAL MEDICINE CLINIC | Facility: CLINIC | Age: 60
End: 2020-02-21

## 2020-02-21 NOTE — TELEPHONE ENCOUNTER
PT on Amoxicillin since Wednesday for resp infection and is getting blurred vision, shaking and faint  He wants to talk to a nurse

## 2020-02-21 NOTE — TELEPHONE ENCOUNTER
States he started sweating when he got home for IC on Wed. Informed him that his body is trying to fight off the infection and it's not the Abx. Reminded him he needs to drink plenty of fluids (not soda) and REST.   Suggested he not go anywhere this weekend

## 2020-02-27 DIAGNOSIS — M50.90 CERVICAL DISC DISEASE: ICD-10-CM

## 2020-02-27 DIAGNOSIS — Q76.1 CERVICAL FUSION SYNDROME: ICD-10-CM

## 2020-02-27 DIAGNOSIS — M47.812 OSTEOARTHRITIS OF CERVICAL SPINE, UNSPECIFIED SPINAL OSTEOARTHRITIS COMPLICATION STATUS: ICD-10-CM

## 2020-02-27 DIAGNOSIS — M51.16 LUMBAR DISC DISEASE WITH RADICULOPATHY: Primary | ICD-10-CM

## 2020-02-27 DIAGNOSIS — M51.36 DEGENERATION OF L4-L5 INTERVERTEBRAL DISC: ICD-10-CM

## 2020-02-27 DIAGNOSIS — M50.20 CERVICAL HERNIATED DISC: ICD-10-CM

## 2020-03-05 DIAGNOSIS — S33.5XXA LUMBAR BACK SPRAIN, INITIAL ENCOUNTER: ICD-10-CM

## 2020-03-05 DIAGNOSIS — M62.838 NECK MUSCLE SPASM: ICD-10-CM

## 2020-03-05 RX ORDER — CYCLOBENZAPRINE HCL 10 MG
TABLET ORAL
Qty: 90 TABLET | Refills: 1 | Status: SHIPPED | OUTPATIENT
Start: 2020-03-05 | End: 2020-05-14

## 2020-03-06 ENCOUNTER — TELEPHONE (OUTPATIENT)
Dept: INTERNAL MEDICINE CLINIC | Facility: CLINIC | Age: 60
End: 2020-03-06

## 2020-03-06 NOTE — TELEPHONE ENCOUNTER
Pt called this morning requesting to speak to [facility]. States that when he went to the Pharmacy to  his prescription, he was told that his insurance may not cover Lyrica in the future without a prior authorization.  He is not due for refill unti

## 2020-03-12 ENCOUNTER — TELEPHONE (OUTPATIENT)
Dept: INTERNAL MEDICINE CLINIC | Facility: CLINIC | Age: 60
End: 2020-03-12

## 2020-03-12 DIAGNOSIS — M50.90 CERVICAL BACK PAIN WITH EVIDENCE OF DISC DISEASE: ICD-10-CM

## 2020-03-12 DIAGNOSIS — M51.36 DEGENERATION OF L4-L5 INTERVERTEBRAL DISC: Primary | ICD-10-CM

## 2020-03-12 DIAGNOSIS — M54.41 CHRONIC RIGHT-SIDED LOW BACK PAIN WITH RIGHT-SIDED SCIATICA: ICD-10-CM

## 2020-03-12 DIAGNOSIS — G89.29 CHRONIC RIGHT-SIDED LOW BACK PAIN WITH RIGHT-SIDED SCIATICA: ICD-10-CM

## 2020-03-12 DIAGNOSIS — M51.16 LUMBAR DISC DISEASE WITH RADICULOPATHY: ICD-10-CM

## 2020-03-12 RX ORDER — HYDROCODONE BITARTRATE AND ACETAMINOPHEN 10; 325 MG/1; MG/1
1 TABLET ORAL EVERY 4 HOURS PRN
Qty: 180 TABLET | Refills: 0 | Status: SHIPPED | OUTPATIENT
Start: 2020-04-12 | End: 2020-05-12

## 2020-03-12 RX ORDER — HYDROCODONE BITARTRATE AND ACETAMINOPHEN 10; 325 MG/1; MG/1
1 TABLET ORAL EVERY 4 HOURS PRN
Qty: 180 TABLET | Refills: 0 | Status: SHIPPED | OUTPATIENT
Start: 2020-05-13 | End: 2020-06-12

## 2020-03-12 RX ORDER — HYDROCODONE BITARTRATE AND ACETAMINOPHEN 10; 325 MG/1; MG/1
1 TABLET ORAL EVERY 4 HOURS PRN
Qty: 180 TABLET | Refills: 0 | Status: SHIPPED | OUTPATIENT
Start: 2020-03-12 | End: 2020-04-11

## 2020-03-12 NOTE — TELEPHONE ENCOUNTER
patient calling would like to be seen by Dr. Ema Barnard tomorrow - informed pt he is booked. & asking where can he get his EMG done.     Please call

## 2020-03-20 ENCOUNTER — TELEPHONE (OUTPATIENT)
Dept: INTERNAL MEDICINE CLINIC | Facility: CLINIC | Age: 60
End: 2020-03-20

## 2020-03-20 RX ORDER — PANTOPRAZOLE SODIUM 40 MG/1
40 TABLET, DELAYED RELEASE ORAL
Qty: 30 TABLET | Refills: 2 | Status: SHIPPED | OUTPATIENT
Start: 2020-03-20 | End: 2020-04-14 | Stop reason: ALTCHOICE

## 2020-03-20 NOTE — TELEPHONE ENCOUNTER
Drinking only water for several days. Can't keep food down. Very hard stools causing very difficult straining. \"Thought I was going to have a heart attack\". Did have a BM today. Also c/o bad reflux.  Has been taking a lot of TUMS to try and settle the G

## 2020-03-20 NOTE — TELEPHONE ENCOUNTER
Patient called and is having terrible stomach problems. Only having bread and water. Chewing on a lot of Tums and no antacid is helping. Also, he's having trouble going to the bathroom, so he's not sure if it is due to all the antacids he is eating.

## 2020-03-24 ENCOUNTER — TELEPHONE (OUTPATIENT)
Dept: INTERNAL MEDICINE CLINIC | Facility: CLINIC | Age: 60
End: 2020-03-24

## 2020-03-25 NOTE — TELEPHONE ENCOUNTER
[facility] called this morning and I had them fax the pre authorization request for the medication. The fax came and I gave it to Taniya to work on.

## 2020-04-14 ENCOUNTER — TELEPHONE (OUTPATIENT)
Dept: INTERNAL MEDICINE CLINIC | Facility: CLINIC | Age: 60
End: 2020-04-14

## 2020-04-14 DIAGNOSIS — K29.50 CHRONIC GASTRITIS WITHOUT BLEEDING, UNSPECIFIED GASTRITIS TYPE: Primary | ICD-10-CM

## 2020-04-14 RX ORDER — FAMOTIDINE 40 MG/1
40 TABLET, FILM COATED ORAL DAILY
Qty: 30 TABLET | Refills: 11 | Status: SHIPPED | OUTPATIENT
Start: 2020-04-14 | End: 2020-08-11

## 2020-04-14 NOTE — TELEPHONE ENCOUNTER
Edward Amador called and the Ranitidine HCL medication he takes has been pulled off of the shelf. What can he take? He said that medication was tearing up his stomach and has bad heartburn.

## 2020-04-28 ENCOUNTER — TELEPHONE (OUTPATIENT)
Dept: INTERNAL MEDICINE CLINIC | Facility: CLINIC | Age: 60
End: 2020-04-28

## 2020-04-28 NOTE — TELEPHONE ENCOUNTER
Recently went shopping for groceries. Used cloth mask and rubbed hand  onto it on the inside. Woke up next morning with breathing issues and burning right lung. After he had a few neb treatments, he felt much better. No other symptoms.   Toribio Handler

## 2020-04-30 ENCOUNTER — VIRTUAL PHONE E/M (OUTPATIENT)
Dept: INTERNAL MEDICINE CLINIC | Facility: CLINIC | Age: 60
End: 2020-04-30
Payer: MEDICAID

## 2020-04-30 DIAGNOSIS — K21.9 GASTROESOPHAGEAL REFLUX DISEASE, ESOPHAGITIS PRESENCE NOT SPECIFIED: ICD-10-CM

## 2020-04-30 DIAGNOSIS — J45.40 MODERATE PERSISTENT ASTHMA WITHOUT COMPLICATION: ICD-10-CM

## 2020-04-30 DIAGNOSIS — M50.90 CERVICAL BACK PAIN WITH EVIDENCE OF DISC DISEASE: ICD-10-CM

## 2020-04-30 DIAGNOSIS — I10 HTN, GOAL BELOW 140/90: Primary | ICD-10-CM

## 2020-04-30 DIAGNOSIS — M51.16 LUMBAR DISC DISEASE WITH RADICULOPATHY: ICD-10-CM

## 2020-04-30 PROCEDURE — 99213 OFFICE O/P EST LOW 20 MIN: CPT | Performed by: FAMILY MEDICINE

## 2020-04-30 NOTE — PROGRESS NOTES
Virtual Telephone Check-In    Javier Buckhorn verbally consents to a Virtual/Telephone Check-In visit on 04/30/20. Patient understands and accepts financial responsibility for any deductible, co-insurance and/or co-pays associated with this service.     Dura

## 2020-05-08 ENCOUNTER — TELEPHONE (OUTPATIENT)
Dept: INTERNAL MEDICINE CLINIC | Facility: CLINIC | Age: 60
End: 2020-05-08

## 2020-05-08 NOTE — TELEPHONE ENCOUNTER
Patient is request a antibiotic for a possible reinfection of mrsa. States that he has a small boil on his leg that is painful and open.  Please call back at [phone]

## 2020-05-11 ENCOUNTER — TELEPHONE (OUTPATIENT)
Dept: INTERNAL MEDICINE CLINIC | Facility: CLINIC | Age: 60
End: 2020-05-11

## 2020-05-11 DIAGNOSIS — M50.90 CERVICAL DISC DISEASE: ICD-10-CM

## 2020-05-11 DIAGNOSIS — M51.16 LUMBAR DISC DISEASE WITH RADICULOPATHY: ICD-10-CM

## 2020-05-11 DIAGNOSIS — M51.36 DEGENERATION OF L4-L5 INTERVERTEBRAL DISC: ICD-10-CM

## 2020-05-11 DIAGNOSIS — M50.20 CERVICAL HERNIATED DISC: ICD-10-CM

## 2020-05-11 DIAGNOSIS — M15.9 PRIMARY OSTEOARTHRITIS INVOLVING MULTIPLE JOINTS: ICD-10-CM

## 2020-05-11 RX ORDER — MISOPROSTOL 200 UG/1
TABLET ORAL
Qty: 60 TABLET | Refills: 2 | Status: SHIPPED | OUTPATIENT
Start: 2020-05-11 | End: 2020-08-11

## 2020-05-11 RX ORDER — MUPIROCIN CALCIUM 20 MG/G
1 CREAM TOPICAL 3 TIMES DAILY
Qty: 30 G | Refills: 1 | Status: SHIPPED | OUTPATIENT
Start: 2020-05-11 | End: 2020-05-16

## 2020-05-11 RX ORDER — DOXYCYCLINE 100 MG/1
100 CAPSULE ORAL 2 TIMES DAILY
Qty: 14 CAPSULE | Refills: 1 | Status: SHIPPED | OUTPATIENT
Start: 2020-05-11 | End: 2020-05-18

## 2020-05-12 ENCOUNTER — TELEPHONE (OUTPATIENT)
Dept: INTERNAL MEDICINE CLINIC | Facility: CLINIC | Age: 60
End: 2020-05-12

## 2020-05-12 RX ORDER — GENTAMICIN SULFATE 1 MG/G
1 OINTMENT TOPICAL 3 TIMES DAILY
Qty: 30 G | Refills: 1 | Status: SHIPPED | OUTPATIENT
Start: 2020-05-12 | End: 2020-05-17

## 2020-05-12 NOTE — TELEPHONE ENCOUNTER
Notes   less than a minute ago View by you   about 16 hours ago View by [employer]   about 16 hours ago Request Sent   about 16 hours ago Save - ePA Send to Plan by [employer]   about 16 hours ago [employer] by [employer]

## 2020-05-12 NOTE — TELEPHONE ENCOUNTER
Elba Esparza Key: [id number] – Rx #: [id number] Need help?  Call us at [phone]   Status   Sent to Plan on May 11   DrugMupirocin Calcium 2% cream   Form[employer] Cross [place] [employer] Electronic PA Form (CB

## 2020-05-12 NOTE — TELEPHONE ENCOUNTER
Artie Lisa Key: [id number] – Rx #: [id number] Need help? Call us at [phone]   Outcome   Denied today   Details of this decision are provided on the physician outcome notice which has been faxed to the number on file.    DrugMupirocin Calcium 2% cream   For

## 2020-05-13 DIAGNOSIS — M15.9 PRIMARY OSTEOARTHRITIS INVOLVING MULTIPLE JOINTS: ICD-10-CM

## 2020-05-13 DIAGNOSIS — M51.36 DEGENERATION OF L4-L5 INTERVERTEBRAL DISC: ICD-10-CM

## 2020-05-13 DIAGNOSIS — J44.9 ASTHMA WITH COPD (CHRONIC OBSTRUCTIVE PULMONARY DISEASE) (HCC): ICD-10-CM

## 2020-05-13 DIAGNOSIS — M51.16 LUMBAR DISC DISEASE WITH RADICULOPATHY: ICD-10-CM

## 2020-05-13 DIAGNOSIS — M50.90 CERVICAL DISC DISEASE: ICD-10-CM

## 2020-05-13 DIAGNOSIS — S33.5XXA LUMBAR BACK SPRAIN, INITIAL ENCOUNTER: ICD-10-CM

## 2020-05-13 DIAGNOSIS — M62.838 NECK MUSCLE SPASM: ICD-10-CM

## 2020-05-13 DIAGNOSIS — M50.20 CERVICAL HERNIATED DISC: ICD-10-CM

## 2020-05-14 ENCOUNTER — TELEPHONE (OUTPATIENT)
Dept: INTERNAL MEDICINE CLINIC | Facility: CLINIC | Age: 60
End: 2020-05-14

## 2020-05-14 RX ORDER — CYCLOBENZAPRINE HCL 10 MG
TABLET ORAL
Qty: 90 TABLET | Refills: 1 | Status: SHIPPED | OUTPATIENT
Start: 2020-05-14 | End: 2020-05-28

## 2020-05-14 RX ORDER — ALBUTEROL SULFATE 90 UG/1
AEROSOL, METERED RESPIRATORY (INHALATION)
Qty: 18 G | Refills: 5 | Status: SHIPPED | OUTPATIENT
Start: 2020-05-14

## 2020-05-14 RX ORDER — ALBUTEROL SULFATE 2.5 MG/3ML
SOLUTION RESPIRATORY (INHALATION)
Qty: 90 ML | Refills: 0 | Status: SHIPPED | OUTPATIENT
Start: 2020-05-14 | End: 2020-06-13

## 2020-05-14 NOTE — TELEPHONE ENCOUNTER
Patient called asking if [facility] sent his refill requests to Dr. Dilshad Rodriguez. They were to send in the refills yesterday,  He said he doesn't know which refills he needs, as [facility] said they were going to call him back, but they never did.

## 2020-05-28 ENCOUNTER — TELEPHONE (OUTPATIENT)
Dept: INTERNAL MEDICINE CLINIC | Facility: CLINIC | Age: 60
End: 2020-05-28

## 2020-05-28 DIAGNOSIS — M62.838 NECK MUSCLE SPASM: ICD-10-CM

## 2020-05-28 DIAGNOSIS — S33.5XXA LUMBAR BACK SPRAIN, INITIAL ENCOUNTER: ICD-10-CM

## 2020-05-28 DIAGNOSIS — M62.830 BACK MUSCLE SPASM: ICD-10-CM

## 2020-05-28 DIAGNOSIS — J44.9 ASTHMA WITH COPD (CHRONIC OBSTRUCTIVE PULMONARY DISEASE) (HCC): Primary | ICD-10-CM

## 2020-05-28 RX ORDER — PREDNISONE 10 MG/1
TABLET ORAL
Qty: 21 TABLET | Refills: 0 | Status: SHIPPED | OUTPATIENT
Start: 2020-05-28 | End: 2020-08-11 | Stop reason: ALTCHOICE

## 2020-05-28 RX ORDER — CYCLOBENZAPRINE HCL 10 MG
10 TABLET ORAL 3 TIMES DAILY PRN
Qty: 90 TABLET | Refills: 2 | Status: SHIPPED | OUTPATIENT
Start: 2020-05-28 | End: 2020-08-11

## 2020-05-28 RX ORDER — PANTOPRAZOLE SODIUM 40 MG/1
TABLET, DELAYED RELEASE ORAL
COMMUNITY
Start: 2020-05-13 | End: 2020-08-11 | Stop reason: ALTCHOICE

## 2020-05-28 NOTE — TELEPHONE ENCOUNTER
Would like a call back from Dr. Dilshad Rodriguez. He knows that he is supposed to come into the clinic to see him, but the last 2 days when he has gone outside, he feels like he's going to smother.

## 2020-06-05 ENCOUNTER — OFFICE VISIT (OUTPATIENT)
Dept: INTERNAL MEDICINE CLINIC | Facility: CLINIC | Age: 60
End: 2020-06-05
Payer: MEDICAID

## 2020-06-05 VITALS
SYSTOLIC BLOOD PRESSURE: 120 MMHG | OXYGEN SATURATION: 97 % | DIASTOLIC BLOOD PRESSURE: 64 MMHG | WEIGHT: 224 LBS | BODY MASS INDEX: 32.07 KG/M2 | HEART RATE: 89 BPM | HEIGHT: 70 IN

## 2020-06-05 DIAGNOSIS — M50.90 CERVICAL BACK PAIN WITH EVIDENCE OF DISC DISEASE: ICD-10-CM

## 2020-06-05 DIAGNOSIS — M51.36 DEGENERATION OF L4-L5 INTERVERTEBRAL DISC: ICD-10-CM

## 2020-06-05 DIAGNOSIS — M51.16 LUMBAR DISC DISEASE WITH RADICULOPATHY: ICD-10-CM

## 2020-06-05 DIAGNOSIS — M21.40 ACQUIRED FLAT FOOT, UNSPECIFIED LATERALITY: ICD-10-CM

## 2020-06-05 DIAGNOSIS — M50.20 CERVICAL HERNIATED DISC: ICD-10-CM

## 2020-06-05 DIAGNOSIS — E78.2 MIXED HYPERLIPIDEMIA: ICD-10-CM

## 2020-06-05 DIAGNOSIS — Z12.5 PROSTATE CANCER SCREENING: ICD-10-CM

## 2020-06-05 DIAGNOSIS — G89.29 CHRONIC LEFT-SIDED LOW BACK PAIN WITH LEFT-SIDED SCIATICA: ICD-10-CM

## 2020-06-05 DIAGNOSIS — I10 HTN, GOAL BELOW 140/90: Primary | ICD-10-CM

## 2020-06-05 DIAGNOSIS — M50.90 CERVICAL DISC DISEASE: ICD-10-CM

## 2020-06-05 DIAGNOSIS — J44.9 ASTHMA WITH COPD (CHRONIC OBSTRUCTIVE PULMONARY DISEASE) (HCC): ICD-10-CM

## 2020-06-05 DIAGNOSIS — F17.210 CIGARETTE SMOKER: ICD-10-CM

## 2020-06-05 DIAGNOSIS — M54.42 CHRONIC LEFT-SIDED LOW BACK PAIN WITH LEFT-SIDED SCIATICA: ICD-10-CM

## 2020-06-05 PROCEDURE — 99406 BEHAV CHNG SMOKING 3-10 MIN: CPT | Performed by: FAMILY MEDICINE

## 2020-06-05 PROCEDURE — 80053 COMPREHEN METABOLIC PANEL: CPT | Performed by: FAMILY MEDICINE

## 2020-06-05 PROCEDURE — 36415 COLL VENOUS BLD VENIPUNCTURE: CPT | Performed by: FAMILY MEDICINE

## 2020-06-05 PROCEDURE — 99396 PREV VISIT EST AGE 40-64: CPT | Performed by: FAMILY MEDICINE

## 2020-06-05 PROCEDURE — 80061 LIPID PANEL: CPT | Performed by: FAMILY MEDICINE

## 2020-06-05 RX ORDER — HYDROCODONE BITARTRATE AND ACETAMINOPHEN 10; 325 MG/1; MG/1
1 TABLET ORAL EVERY 4 HOURS PRN
Qty: 180 TABLET | Refills: 0 | Status: SHIPPED | OUTPATIENT
Start: 2020-08-06 | End: 2020-09-05

## 2020-06-05 RX ORDER — PREGABALIN 150 MG/1
150 CAPSULE ORAL 2 TIMES DAILY
Qty: 60 CAPSULE | Refills: 2 | Status: SHIPPED | OUTPATIENT
Start: 2020-06-05 | End: 2020-08-11

## 2020-06-05 RX ORDER — HYDROCODONE BITARTRATE AND ACETAMINOPHEN 10; 325 MG/1; MG/1
1 TABLET ORAL EVERY 4 HOURS PRN
Qty: 180 TABLET | Refills: 0 | Status: SHIPPED | OUTPATIENT
Start: 2020-07-06 | End: 2020-08-05

## 2020-06-05 RX ORDER — KETOROLAC TROMETHAMINE 30 MG/ML
30 INJECTION, SOLUTION INTRAMUSCULAR; INTRAVENOUS ONCE
Status: SHIPPED | OUTPATIENT
Start: 2020-06-05 | End: 2020-06-06

## 2020-06-05 RX ORDER — HYDROCODONE BITARTRATE AND ACETAMINOPHEN 10; 325 MG/1; MG/1
1 TABLET ORAL EVERY 4 HOURS PRN
Qty: 180 TABLET | Refills: 0 | Status: SHIPPED | OUTPATIENT
Start: 2020-06-05 | End: 2020-07-05

## 2020-06-05 NOTE — PROGRESS NOTES
CC:  Physical (Pt presents to clinic for annual physical. )      Hx of CC:  EXAM/LABS.   F/UP ON HTN, LIPIDS, COPD/ASTHMA AND SPINAL DISC DISEASE  BREATHING \"FAIR\" WITH INHALERS AND EPISODIC LOW DOSE ORAL PREDNISONE.  HAS BUT BACK TOBACCO TO 1/4 PPD BUT N hours as needed for Pain. Dispense: 180 tablet; Refill: 0  - HYDROcodone-acetaminophen  MG Oral Tab; Take 1 tablet by mouth every 4 (four) hours as needed for Pain. Dispense: 180 tablet;  Refill: 0  - Ketorolac Tromethamine (TORADOL) 60 MG/2ML injec 0  - HYDROcodone-acetaminophen  MG Oral Tab; Take 1 tablet by mouth every 4 (four) hours as needed for Pain. Dispense: 180 tablet; Refill: 0    9. Degeneration of L4-L5 intervertebral disc    - pregabalin 150 MG Oral Cap;  Take 1 capsule (150 mg tota collaboratively selected appropriate treatment goals and methods based on the patient’s interest in and willingness to change the behavior.    Assist: We used behavior change techniques (self-help and/or counseling), to aid Essie Innocent in achieving agreed-upon goal

## 2020-06-05 NOTE — PROGRESS NOTES
Pt presented to clinic today for blood draw. Per physician able to draw orders. Orders  documented within chart. Pt tolerated lab draw well.  verified.   Orders drawn include: cmp, lipid, psa  Site of draw: rt cuauhtemoc Saab CMA

## 2020-06-08 ENCOUNTER — TELEPHONE (OUTPATIENT)
Dept: INTERNAL MEDICINE CLINIC | Facility: CLINIC | Age: 60
End: 2020-06-08

## 2020-06-08 DIAGNOSIS — E78.2 MIXED HYPERLIPIDEMIA: ICD-10-CM

## 2020-06-08 RX ORDER — ATORVASTATIN CALCIUM 40 MG/1
TABLET, FILM COATED ORAL
Qty: 30 TABLET | Refills: 5 | Status: SHIPPED | OUTPATIENT
Start: 2020-06-08 | End: 2020-08-11

## 2020-06-13 DIAGNOSIS — J44.9 ASTHMA WITH COPD (CHRONIC OBSTRUCTIVE PULMONARY DISEASE) (HCC): ICD-10-CM

## 2020-06-13 RX ORDER — ALBUTEROL SULFATE 2.5 MG/3ML
SOLUTION RESPIRATORY (INHALATION)
Qty: 75 ML | Refills: 0 | Status: SHIPPED | OUTPATIENT
Start: 2020-06-13 | End: 2020-08-11

## 2020-07-11 DIAGNOSIS — J30.1 SEASONAL ALLERGIC RHINITIS DUE TO POLLEN: ICD-10-CM

## 2020-07-11 RX ORDER — FLUTICASONE PROPIONATE 50 MCG
SPRAY, SUSPENSION (ML) NASAL
Qty: 16 G | Refills: 0 | Status: SHIPPED | OUTPATIENT
Start: 2020-07-11 | End: 2020-08-11

## 2020-07-14 ENCOUNTER — TELEPHONE (OUTPATIENT)
Dept: INTERNAL MEDICINE CLINIC | Facility: CLINIC | Age: 60
End: 2020-07-14

## 2020-07-14 NOTE — TELEPHONE ENCOUNTER
Insurance stopped covering the 3% diclofenac; was changed to 1% and now they are not covering that. In lots of pain. Lidocaine 5% worked really worked. Suggested trying a [employer] which may be much cheaper and bypasses insurance.     Patient

## 2020-07-15 NOTE — TELEPHONE ENCOUNTER
Gave patient information on pharmacies that have discounted pricing.           [facility]:  [phone]     [facility]: [address] [place] [address]

## 2020-07-16 ENCOUNTER — TELEPHONE (OUTPATIENT)
Dept: INTERNAL MEDICINE CLINIC | Facility: CLINIC | Age: 60
End: 2020-07-16

## 2020-07-16 NOTE — TELEPHONE ENCOUNTER
Wilma Ibrahim KeyMack Liter – Rx #: [id number] Need help? Call us at [phone]   Status   Sent to [facility]ila   DrugDiclofenac Sodium 1% gel   [employer] Cross [employer] Electronic PA Form (CB)   Original Claim Info75 NON-PREFERRED.  PA REQ`D. [id number]

## 2020-07-17 ENCOUNTER — TELEPHONE (OUTPATIENT)
Dept: INTERNAL MEDICINE CLINIC | Facility: CLINIC | Age: 60
End: 2020-07-17

## 2020-07-17 NOTE — TELEPHONE ENCOUNTER
LVM:  trazadone submitted. PCP will not give any additional medication.  If what he takes currently is not enough, he needs to consult with Pain Management specialist.

## 2020-07-17 NOTE — TELEPHONE ENCOUNTER
However they will cover  Celecoxib  Diflunisal tablets  Etodolac  Flurbiprofen  ibu- 400,600,800  Indomethacin  Nabumetone  Naproxen  Salsalate tablets  sulindac

## 2020-07-17 NOTE — TELEPHONE ENCOUNTER
Patient is calling stating that he is in extreme pain. States that the tightness on his left side is stopping him from being able to bend over to tie his shoes or taking steps. Also, states that the flexeril is not helping at all.  Patient has an virtual ap

## 2020-07-17 NOTE — TELEPHONE ENCOUNTER
Marvinfavio Mendez Combe – Rx #: [id number] Need help? Call us at [phone]   Outcome   Denied on July 16   Details of this decision are provided on the physician outcome notice which has been faxed to the number on file.    DrugDiclofenac Sodium 1% gel

## 2020-07-20 ENCOUNTER — VIRTUAL PHONE E/M (OUTPATIENT)
Dept: INTERNAL MEDICINE CLINIC | Facility: CLINIC | Age: 60
End: 2020-07-20
Payer: MEDICAID

## 2020-07-20 DIAGNOSIS — M50.90 CERVICAL BACK PAIN WITH EVIDENCE OF DISC DISEASE: ICD-10-CM

## 2020-07-20 DIAGNOSIS — M62.838 MUSCLE SPASM OF LEFT LOWER EXTREMITY: ICD-10-CM

## 2020-07-20 DIAGNOSIS — M54.32 SCIATICA OF LEFT SIDE: ICD-10-CM

## 2020-07-20 DIAGNOSIS — M51.16 LUMBAR DISC DISEASE WITH RADICULOPATHY: ICD-10-CM

## 2020-07-20 DIAGNOSIS — M51.36 DEGENERATION OF L4-L5 INTERVERTEBRAL DISC: Primary | ICD-10-CM

## 2020-07-20 PROCEDURE — 99213 OFFICE O/P EST LOW 20 MIN: CPT | Performed by: FAMILY MEDICINE

## 2020-07-20 NOTE — PROGRESS NOTES
Virtual Telephone Check-In    Dilia Pantoja verbally consents to a Virtual/Telephone Check-In visit on 07/20/20. Patient has been referred to the [facility] website at [email or web address] to review the yearly Consent to Treat document.     Patient understands

## 2020-08-10 ENCOUNTER — TELEPHONE (OUTPATIENT)
Dept: INTERNAL MEDICINE CLINIC | Facility: CLINIC | Age: 60
End: 2020-08-10

## 2020-08-11 DIAGNOSIS — E78.2 MIXED HYPERLIPIDEMIA: ICD-10-CM

## 2020-08-11 DIAGNOSIS — M51.16 LUMBAR DISC DISEASE WITH RADICULOPATHY: ICD-10-CM

## 2020-08-11 DIAGNOSIS — J45.51 SEVERE PERSISTENT ASTHMA WITH ACUTE EXACERBATION: ICD-10-CM

## 2020-08-11 DIAGNOSIS — I10 HTN, GOAL BELOW 140/90: ICD-10-CM

## 2020-08-11 DIAGNOSIS — J30.1 SEASONAL ALLERGIC RHINITIS DUE TO POLLEN: ICD-10-CM

## 2020-08-11 DIAGNOSIS — M50.90 CERVICAL DISC DISEASE: ICD-10-CM

## 2020-08-11 DIAGNOSIS — R51.9 NONINTRACTABLE HEADACHE, UNSPECIFIED CHRONICITY PATTERN, UNSPECIFIED HEADACHE TYPE: ICD-10-CM

## 2020-08-11 DIAGNOSIS — M62.830 BACK MUSCLE SPASM: ICD-10-CM

## 2020-08-11 DIAGNOSIS — M50.20 CERVICAL HERNIATED DISC: ICD-10-CM

## 2020-08-11 DIAGNOSIS — M50.90 CERVICAL BACK PAIN WITH EVIDENCE OF DISC DISEASE: ICD-10-CM

## 2020-08-11 DIAGNOSIS — M62.838 NECK MUSCLE SPASM: ICD-10-CM

## 2020-08-11 DIAGNOSIS — K29.50 CHRONIC GASTRITIS WITHOUT BLEEDING, UNSPECIFIED GASTRITIS TYPE: ICD-10-CM

## 2020-08-11 DIAGNOSIS — M51.36 DEGENERATION OF L4-L5 INTERVERTEBRAL DISC: ICD-10-CM

## 2020-08-11 DIAGNOSIS — M15.9 PRIMARY OSTEOARTHRITIS INVOLVING MULTIPLE JOINTS: ICD-10-CM

## 2020-08-11 DIAGNOSIS — S33.5XXA LUMBAR BACK SPRAIN, INITIAL ENCOUNTER: ICD-10-CM

## 2020-08-11 DIAGNOSIS — J44.9 ASTHMA WITH COPD (CHRONIC OBSTRUCTIVE PULMONARY DISEASE) (HCC): ICD-10-CM

## 2020-08-11 RX ORDER — TOPIRAMATE 25 MG/1
25 TABLET ORAL DAILY
Qty: 30 TABLET | Refills: 5 | Status: SHIPPED | OUTPATIENT
Start: 2020-08-11

## 2020-08-11 RX ORDER — FAMOTIDINE 40 MG/1
40 TABLET, FILM COATED ORAL DAILY
Qty: 30 TABLET | Refills: 11 | Status: SHIPPED | OUTPATIENT
Start: 2020-08-11

## 2020-08-11 RX ORDER — TOPIRAMATE 50 MG/1
TABLET, FILM COATED ORAL
Qty: 30 TABLET | Refills: 5 | Status: SHIPPED | OUTPATIENT
Start: 2020-08-11

## 2020-08-11 RX ORDER — TRAZODONE HYDROCHLORIDE 100 MG/1
100 TABLET ORAL NIGHTLY
Qty: 30 TABLET | Refills: 5 | Status: SHIPPED | OUTPATIENT
Start: 2020-08-11 | End: 2021-08-06

## 2020-08-11 RX ORDER — CYCLOBENZAPRINE HCL 10 MG
10 TABLET ORAL 3 TIMES DAILY PRN
Qty: 90 TABLET | Refills: 2 | Status: SHIPPED | OUTPATIENT
Start: 2020-08-11

## 2020-08-11 RX ORDER — FLUTICASONE PROPIONATE 50 MCG
SPRAY, SUSPENSION (ML) NASAL
Qty: 16 G | Refills: 5 | Status: SHIPPED | OUTPATIENT
Start: 2020-08-11

## 2020-08-11 RX ORDER — MONTELUKAST SODIUM 10 MG/1
10 TABLET ORAL DAILY
Qty: 30 TABLET | Refills: 11 | Status: SHIPPED | OUTPATIENT
Start: 2020-08-11

## 2020-08-11 RX ORDER — ALBUTEROL SULFATE 2.5 MG/3ML
2.5 SOLUTION RESPIRATORY (INHALATION) EVERY 6 HOURS PRN
Qty: 75 ML | Refills: 1 | Status: SHIPPED | OUTPATIENT
Start: 2020-08-11

## 2020-08-11 RX ORDER — LISINOPRIL 10 MG/1
10 TABLET ORAL 2 TIMES DAILY
Qty: 60 TABLET | Refills: 11 | Status: SHIPPED | OUTPATIENT
Start: 2020-08-11 | End: 2021-08-06

## 2020-08-11 RX ORDER — PREGABALIN 150 MG/1
150 CAPSULE ORAL 2 TIMES DAILY
Qty: 60 CAPSULE | Refills: 2 | Status: SHIPPED | OUTPATIENT
Start: 2020-08-11

## 2020-08-11 RX ORDER — ATORVASTATIN CALCIUM 40 MG/1
40 TABLET, FILM COATED ORAL NIGHTLY
Qty: 30 TABLET | Refills: 5 | Status: SHIPPED | OUTPATIENT
Start: 2020-08-11

## 2020-08-11 RX ORDER — MISOPROSTOL 200 UG/1
TABLET ORAL
Qty: 60 TABLET | Refills: 2 | Status: SHIPPED | OUTPATIENT
Start: 2020-08-11

## 2020-08-11 RX ORDER — FLUTICASONE PROPIONATE 50 MCG
1 SPRAY, SUSPENSION (ML) NASAL 2 TIMES DAILY
Qty: 16 G | Refills: 5 | Status: SHIPPED | OUTPATIENT
Start: 2020-08-11 | End: 2020-08-11

## 2020-12-19 NOTE — LETTER
10/18/2021    Elisabet Villafana        [address] [place] [address]            Dear Elisabet Villafana,      Our records indicate that you are due for an appointment for a Colonoscopy in December 2021, or shortly there after, with Perry Shannon MD. Right ankle injury with swelling.

## 2021-03-17 DIAGNOSIS — Z23 NEED FOR VACCINATION: ICD-10-CM

## 2021-10-18 ENCOUNTER — TELEPHONE (OUTPATIENT)
Dept: GASTROENTEROLOGY | Facility: CLINIC | Age: 61
End: 2021-10-18

## 2021-10-18 NOTE — TELEPHONE ENCOUNTER
----- Message from Arianna Riggs RN sent at 12/26/2018  3:45 PM CST -----  Regarding: 3 year EGD and colonoscopy recall   3 Year colonoscopy and EGD recall placed in system per Dr. Schuster Room  . Colonoscopy done on 12/18/18  . Next due on 12/18/21  .  Snapshot u

## 2023-11-09 NOTE — TELEPHONE ENCOUNTER
Tim Parra has diarrhea and is wondering if he should take 2 protonix. He is almost out of Protonix. normal

## 2024-12-22 NOTE — TELEPHONE ENCOUNTER
Pt has some questions about his prescriptions. He believes the pharmacy has made a mistake, please advise. no

## (undated) DEVICE — STERILE LATEX POWDER-FREE SURGICAL GLOVESWITH NITRILE COATING: Brand: PROTEXIS

## (undated) DEVICE — KNEE IMMOBILIZER: Brand: DEROYAL

## (undated) DEVICE — ABDOMINAL PAD: Brand: CURITY

## (undated) DEVICE — ZIMMER® STERILE DISPOSABLE TOURNIQUET CUFF WITH PLC, DUAL PORT, SINGLE BLADDER, 34 IN. (86 CM)

## (undated) DEVICE — SNARE 9MM 230CM 2.4MM EXACTO

## (undated) DEVICE — ENDOSCOPY PACK UPPER: Brand: MEDLINE INDUSTRIES, INC.

## (undated) DEVICE — CULTURE TUBE ANAEROBIC

## (undated) DEVICE — CHLORAPREP 26ML APPLICATOR

## (undated) DEVICE — SNARE OPTMZ PLPCTM TRP

## (undated) DEVICE — FORCEP RADIAL JAW 4

## (undated) DEVICE — CONTAINER SPEC STR 4OZ GRY LID

## (undated) DEVICE — Device: Brand: DEFENDO AIR/WATER/SUCTION AND BIOPSY VALVE

## (undated) DEVICE — LOWER EXTREMITY: Brand: MEDLINE INDUSTRIES, INC.

## (undated) DEVICE — CULTURE COLLECT/TRANSPORT SYS

## (undated) DEVICE — DRAIN PENROSE 12X1/4

## (undated) DEVICE — LINE MNTR ADLT SET O2 INTMD

## (undated) DEVICE — ENDOSCOPY PACK - LOWER: Brand: MEDLINE INDUSTRIES, INC.

## (undated) DEVICE — SOL  .9 1000ML BTL

## (undated) NOTE — LETTER
01/23/19        Dunia Lee  [address]      Dear Rhoda Barajas,    [facility] records indicate that you have outstanding lab work and or testing that was ordered for you and has not yet been completed:    CT Abdomen and Pelvis - call [phone]

## (undated) NOTE — MR AVS SNAPSHOT
[address] [facility] [address] [facility] [id number] [phone]               Thank you for choosing us for your health care visit with Claire Shah DO.   We are glad to serve you and happy to provide you with this jama What changed:  Another medication with the same name was removed. Continue taking this medication, and follow the directions you see here.    Commonly known as:  VENTOLIN           Cyclobenzaprine HCl 10 MG Tabs   TK 1 T PO TID   Commonly known as:  cyclobe Take 1 tablet (25 mg total) by mouth 2 (two) times daily. Commonly known as: Topamax           TraZODone HCl 100 MG Tabs   Take 1 tablet (100 mg total) by mouth nightly. Commonly known as:  DESYREL           * Notice:   This list has 4 medication(s) th schedule your appointment. If you are confident that your benefit plan will not require a referral or authorization, such as [place] Medicaid, please feel free to schedule your appointment immediately.  However, if you are unsure about the requirements information, go to [email or web address] and click on the Sign Up Now link in the [employer] box. Enter your [employer] Activation Code exactly as it appears below along with your Zip Code and Date of Birth to complete the sign-up process.  If you do

## (undated) NOTE — LETTER
Hospital Discharge Documentation  Please phone to schedule a hospital follow up appointment.     From: [address] [facility]  Phone: [phone]    Patient discharged time/date: 10/1/2018  5:57 PM  Patient discharge disposition:  Home or Self Reason for Admission:[FG.1]   Patient Active Problem List:     C2-3 mild-mod central bulging disc     C3-4 mild-mod diffuse herniated disc     Neck pain     Right-sided headache     s/p C4-C7 ACDF     Cervical spondylosis     Moderate persistent asthma wit -home with oral clindamycin  -Pain control adequate  -cultures negative  -PT/OT following--> home with walker  -op f/u with Dr. Zachary Christiansen     Other Issues  HTN  HL  DJD of cervical and lumbar spine  GERD w/ Patel's esophagus     DVT PPx: ASA BID for dvt p Quantity:  90 tablet  Refills:  2     Fluticasone Propionate 50 MCG/ACT Susp  Commonly known as:  FLONASE      USE 2 SPRAYS IN EACH NOSTRIL DAILY   Quantity:  1 Bottle  Refills:  5     Fluticasone-Salmeterol 232-14 MCG/ACT Aepb  Commonly known as:  AIRDUO Diclofenac Sodium 1 % Gel  Commonly known as:  VOLTAREN        Doxycycline Monohydrate 100 MG Tabs        FLUVIRIN Susp  Generic drug:  Influenza Vac Typ A&B Surf Ant        Sulfamethoxazole-TMP -160 MG Tabs per tablet  Commonly known as:  BACT

## (undated) NOTE — LETTER
12/17/2018              Bobby White        [facility]         Dear Antonino Mancera,      It was a pleasure to see you at our [address] office. Your lab tests look good, no evidence of anemia.   P

## (undated) NOTE — MR AVS SNAPSHOT
[address] at [address] [facility] [phone]               Thank you for choosing us for your health care visit with Dorothy Aguilar DO.   We are glad to serve you and happy to provide you with this jama Colon cancer screening   Order:  Shahzad Maldonado - Internal          Referral Orders      Normal Orders This Visit    Jennifer Olvera - INTERNAL [[id number] CUSTOM]  Order #:  [id number]         **REFERRAL REQUEST**    Your physician has referred you to a specialist.  Your phy Reason for Today's Visit     Headache     Hand Pain           Medical Issues Discussed Today     Cervical spondylosis    Lumbar disc disease with radiculopathy    Right-sided headache    Peripheral vasodilation (HCC)        Eczema, unspecified type What changed:  See the new instructions. Commonly known as:  PRINIVIL,ZESTRIL           methylPREDNISolone 4 MG Tbpk   Take by mouth as directed on the package. Commonly known as:  MEDROL DOSEPACK           Montelukast Sodium 10 MG Tabs   daily.    Comm your doctor or other care provider to review them with you.          Where to Get Your Medications      These medications were sent to [facility]  [address] [place] [address] Jesus Alberto Reddy, [phone], [phone]

## (undated) NOTE — MR AVS SNAPSHOT
[address] at [address] [phone]               Thank you for choosing us for your health care visit with To Krause DO.   We are glad to serve you and happy to provide you with this jama authorization, such as [place], please feel free to schedule your appointment immediately. However, if you are unsure about the requirements for authorization, please wait 5-7 days and then contact your physician's   office.  At that time, you laurie * lidocaine 5 % Oint   APPLY TO THE AFFECTED AREA THREE TIMES DAILY AS NEEDED   Commonly known as:  XYLOCAINE           * lisinopril 10 MG Tabs   Take 1 tablet (10 mg total) by mouth daily.    Commonly known as:  PRINIVIL,ZESTRIL           * lisinopril 20 If you have questions, you can call [phone] to talk to our [facility] Staff. Remember, MyChart is NOT to be used for urgent needs. For medical emergencies, dial 911.         Educational Information     Your blood pressure indicates you may be a

## (undated) NOTE — MR AVS SNAPSHOT
[address]  [phone]               Thank you for choosing us for your health care visit with Hoa Grace DPM.  We are glad to serve you and happy to provide yo Medical Issues Discussed Today     Right foot pain    -  Primary    Tendonitis of ankle or foot          Instructions and Information about Your Health     None      Allergies as of Mar 09, 2017     No Known Allergies                   Current Medicatio TAKE 1 TABLET(40 MG) BY MOUTH EVERY EVENING   Commonly known as:  ZOCOR           topiramate 25 MG Tabs   Take 1 tablet (25 mg total) by mouth 2 (two) times daily. Commonly known as:   Topamax           TraZODone HCl 100 MG Tabs   Take 1 tablet (100 mg to

## (undated) NOTE — LETTER
7/12/2019        To:  mari Dueñas Expedite authorization   Fax:  [phone]    From:  Dr. Abiel Flores    Re:  Veda Jeong, Case # [id number], patient ID [id number]    Mr. Rodriguez has known chronic lumbar disc disease with radiculopathy which ha

## (undated) NOTE — Clinical Note
March 9, 2017         Leslie Kc DO  [address] [facility] [address]      Patient: Mireya Mullins   YOB: 1960   Date of Visit: 3/9/2017       Dear Dr. Chip Bhatt,    I saw your patient, Mireya Mullins, on 3/9/2017.  Enclosed is my consultati

## (undated) NOTE — Clinical Note
Pt currently does not have his HFU appt scheduled. An appt was offered however the pt declined at this time. He plans to call the office back. TE was sent to the office to FU on HFU appt. Pt stated he is doing okay since d/c.  He has a lot of pain still but